# Patient Record
Sex: MALE | Race: WHITE | NOT HISPANIC OR LATINO | Employment: FULL TIME | URBAN - METROPOLITAN AREA
[De-identification: names, ages, dates, MRNs, and addresses within clinical notes are randomized per-mention and may not be internally consistent; named-entity substitution may affect disease eponyms.]

---

## 2017-10-06 ENCOUNTER — APPOINTMENT (OUTPATIENT)
Dept: OTHER | Facility: HOSPITAL | Age: 57
End: 2017-10-06

## 2017-11-01 ENCOUNTER — OFFICE VISIT (OUTPATIENT)
Dept: ONCOLOGY | Facility: CLINIC | Age: 57
End: 2017-11-01

## 2017-11-01 ENCOUNTER — LAB (OUTPATIENT)
Dept: OTHER | Facility: HOSPITAL | Age: 57
End: 2017-11-01

## 2017-11-01 VITALS
HEART RATE: 68 BPM | TEMPERATURE: 98.5 F | HEIGHT: 74 IN | RESPIRATION RATE: 16 BRPM | WEIGHT: 210 LBS | SYSTOLIC BLOOD PRESSURE: 118 MMHG | BODY MASS INDEX: 26.95 KG/M2 | DIASTOLIC BLOOD PRESSURE: 75 MMHG | OXYGEN SATURATION: 97 %

## 2017-11-01 DIAGNOSIS — C95.10 CHRONIC LEUKEMIA OF UNSPECIFIED CELL TYPE NOT HAVING ACHIEVED REMISSION (HCC): Primary | ICD-10-CM

## 2017-11-01 LAB
BASOPHILS # BLD AUTO: 0.05 10*3/MM3 (ref 0–0.2)
BASOPHILS NFR BLD AUTO: 0.7 % (ref 0–1.5)
DEPRECATED RDW RBC AUTO: 38 FL (ref 37–54)
EOSINOPHIL # BLD AUTO: 0.1 10*3/MM3 (ref 0–0.7)
EOSINOPHIL NFR BLD AUTO: 1.4 % (ref 0.3–6.2)
ERYTHROCYTE [DISTWIDTH] IN BLOOD BY AUTOMATED COUNT: 12.1 % (ref 11.5–14.5)
HCT VFR BLD AUTO: 39.7 % (ref 40.4–52.2)
HGB BLD-MCNC: 13.7 G/DL (ref 13.7–17.6)
IMM GRANULOCYTES # BLD: 0.04 10*3/MM3 (ref 0–0.03)
IMM GRANULOCYTES NFR BLD: 0.5 % (ref 0–0.5)
LYMPHOCYTES # BLD AUTO: 2.28 10*3/MM3 (ref 0.9–4.8)
LYMPHOCYTES NFR BLD AUTO: 31.3 % (ref 19.6–45.3)
MCH RBC QN AUTO: 29.5 PG (ref 27–32.7)
MCHC RBC AUTO-ENTMCNC: 34.5 G/DL (ref 32.6–36.4)
MCV RBC AUTO: 85.4 FL (ref 79.8–96.2)
MONOCYTES # BLD AUTO: 0.6 10*3/MM3 (ref 0.2–1.2)
MONOCYTES NFR BLD AUTO: 8.2 % (ref 5–12)
NEUTROPHILS # BLD AUTO: 4.21 10*3/MM3 (ref 1.9–8.1)
NEUTROPHILS NFR BLD AUTO: 57.9 % (ref 42.7–76)
NRBC BLD MANUAL-RTO: 0 /100 WBC (ref 0–0)
PLATELET # BLD AUTO: 255 10*3/MM3 (ref 140–500)
PMV BLD AUTO: 10.5 FL (ref 6–12)
RBC # BLD AUTO: 4.65 10*6/MM3 (ref 4.6–6)
WBC NRBC COR # BLD: 7.28 10*3/MM3 (ref 4.5–10.7)

## 2017-11-01 PROCEDURE — 36415 COLL VENOUS BLD VENIPUNCTURE: CPT

## 2017-11-01 PROCEDURE — 85025 COMPLETE CBC W/AUTO DIFF WBC: CPT | Performed by: INTERNAL MEDICINE

## 2017-11-01 PROCEDURE — 99213 OFFICE O/P EST LOW 20 MIN: CPT | Performed by: INTERNAL MEDICINE

## 2017-11-01 RX ORDER — TIZANIDINE 2 MG/1
TABLET ORAL
COMMUNITY
Start: 2017-07-31 | End: 2020-10-30

## 2017-11-01 RX ORDER — METHYLPREDNISOLONE 4 MG/1
TABLET ORAL
COMMUNITY
Start: 2017-07-31 | End: 2017-11-01

## 2017-11-01 RX ORDER — OMEPRAZOLE 40 MG/1
CAPSULE, DELAYED RELEASE ORAL
COMMUNITY
Start: 2017-09-11

## 2017-11-01 RX ORDER — FLUTICASONE PROPIONATE 50 MCG
SPRAY, SUSPENSION (ML) NASAL
COMMUNITY
Start: 2017-09-11

## 2017-11-01 NOTE — PROGRESS NOTES
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        HISTORY OF PRESENT ILLNESS: The patient is a 57-year-old  male presenting today for annual followup. He reports a lot stress in life, undergoing devoice process because his wife is alcoholic.  Otherwise he reports excellent performance ECOG 0. No weight loss, no fever, sweating. He denies easy infection, no fever, sweating or chills.        Laboratory study today reports a normal total WBC at 7280, including lymphocytes at 2280, and improved neutrophils 4210, and monocytes 600.  Hemoglobin 13.7 and platelets at 255,000.        PAST MEDICAL HISTORY:    1. Two strep throat infections in 2009.    2. Gastroesophageal reflux.    3. Seasonal allergies.        PAST SURGICAL HISTORY:    1. Multiple lipoma resection from his back.    2. Right ankle Achilles tendon repair.        HEMATOLOGIC/ONCOLOGIC HISTORY: History from previous dates can be found in a separate document.        The patient was re-evaluated on 2015. The patient reports no symptoms. Physical examination was unremarkable. Laboratory study reporting normal total WBC at 7050, including lymphocytes at 3270, neutrophils 2920, and monocytes 700. Hemoglobin was normal at 14.5 and platelets at 247,000.        MEDICATIONS: The current medication list was reviewed with the patient and updates in the EMR this date per the medical assistant. Medication dosages and frequencies were confirmed to be accurate        ALLERGIES: No known drug allergies.        SOCIAL HISTORY: . 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 Previously worked as . The patient has no history of tobacco smoking or alcohol use. No illegal drug use, no risk for HIV.        FAMILY HISTORY: Father  of heart attack at age 67. Patient's mother is in good health at age 75 in . A brother at age 52 with unknown health  "condition. His two sisters are both in good health in their 40' s. The patient has 3 teenage children all in good health condition . The patient's maternal grandmother had hypertension, and colon cancer. Maternal grandfather had bone cancer, no details. Paternal grandfather had prostate cancer.        REVIEW OF SYSTEMS:    PAIN: See VITAL SIGNS below.    GENE RAL: No change in appetite or weight; no fevers, chills, sweats.     SKIN: No rashes or nonhealing lesions.     HEME/LYMPH: No anemia, easy bruising, bleeding or swollen nodes.     EYES: No vision changes or diplopia.     ENT: No tinnitus, hearing loss, gum bleeding, epistaxis, hoarseness or dysphagia.     RESPIRATORY: No cough, shortness of breath, hemoptysis or wheezing.     CVS: No chest pain, palpitations, orthopnea, dyspnea on exertion or PND.    GI: Chronic indigestion.    : No lower tract obstructive symptoms, dysuria or hematuria.    MUSCULOSKELETAL: No bone pain or joint stiffness.    NEUROLOGICAL: No dizziness, global weakness, loss of consciousness or seizures.    PSYCHIATRIC: No increased nervousness, mood changes or depression.          VITAL SIGNS:    Vitals:    17 1428   BP: 118/75   Pulse: 68   Resp: 16   Temp: 98.5 °F (36.9 °C)   TempSrc: Oral   SpO2: 97%   Weight: 210 lb (95.3 kg)   Height: 74.02\" (188 cm)  Comment: NEW HT   PainSc: 0-No pain   ECO          PHYSICAL EXAMINATION:    GENERAL: Well-developed and well-nourished, healthy looking male.     SKIN: Warm and dry without rashes, purpura or petechiae.     HEAD: Normocephalic.    EYES: Conjunctivae normal.     EARS: Hearing intact.     NOSE: No nasal discharge.     MOUTH: Lips normal.     THROAT: Oropharynx without lesions or exudates.    NECK: Supple with good range of motion; no thyromegaly or masses.     LYMPHATICS: No cervical, supraclavicular adenopathy.     CHEST: Lungs clear to auscultation.     CARDIAC: Regular rate and rhythm without murmurs, rubs or gallops. "     ABDOMEN: Soft and nontender with no organomegaly or masses.     EXTREMITIES: No clubbing, cyanosis or edema.  Left hand small finger has deformity due to injury.    NEURO: No focal deficits.        LABORATORY DATA:    Lab Results   Component Value Date    WBC 7.28 11/01/2017    HGB 13.7 11/01/2017    HCT 39.7 (L) 11/01/2017    MCV 85.4 11/01/2017     11/01/2017     Lab Results   Component Value Date    NEUTROABS 4.21 11/01/2017           ASSESSMENT/PLAN: The patient is a 57-year-old  male who comes in for annual follow up evaluation.  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 The patient has no specific complaints.  Physical examination is benign. Laboratory study showed Improved WBC/ANC and platelet.  No evidence of disease progress.  Continue to observe.         We will bring him back in 12 months for annual followup and repeat CBC check at that time.              DINESH ISRAEL M.D., Ph.D.    11/01/2017      cc:  MD KINDRA LARKIN M.D. - FAX: [phone] (patient's brother)

## 2018-06-20 VITALS
SYSTOLIC BLOOD PRESSURE: 135 MMHG | SYSTOLIC BLOOD PRESSURE: 106 MMHG | OXYGEN SATURATION: 98 % | SYSTOLIC BLOOD PRESSURE: 112 MMHG | HEART RATE: 59 BPM | TEMPERATURE: 96.7 F | SYSTOLIC BLOOD PRESSURE: 147 MMHG | HEIGHT: 74 IN | SYSTOLIC BLOOD PRESSURE: 111 MMHG | DIASTOLIC BLOOD PRESSURE: 72 MMHG | HEART RATE: 49 BPM | RESPIRATION RATE: 30 BRPM | HEART RATE: 55 BPM | DIASTOLIC BLOOD PRESSURE: 66 MMHG | SYSTOLIC BLOOD PRESSURE: 121 MMHG | SYSTOLIC BLOOD PRESSURE: 101 MMHG | DIASTOLIC BLOOD PRESSURE: 57 MMHG | WEIGHT: 220 LBS | DIASTOLIC BLOOD PRESSURE: 75 MMHG | RESPIRATION RATE: 15 BRPM | SYSTOLIC BLOOD PRESSURE: 105 MMHG | DIASTOLIC BLOOD PRESSURE: 88 MMHG | HEART RATE: 61 BPM | RESPIRATION RATE: 18 BRPM | HEART RATE: 52 BPM | SYSTOLIC BLOOD PRESSURE: 119 MMHG | OXYGEN SATURATION: 95 % | SYSTOLIC BLOOD PRESSURE: 107 MMHG | OXYGEN SATURATION: 94 % | TEMPERATURE: 97.1 F | OXYGEN SATURATION: 99 % | RESPIRATION RATE: 23 BRPM | DIASTOLIC BLOOD PRESSURE: 64 MMHG | DIASTOLIC BLOOD PRESSURE: 74 MMHG | HEART RATE: 54 BPM | DIASTOLIC BLOOD PRESSURE: 83 MMHG | RESPIRATION RATE: 14 BRPM | DIASTOLIC BLOOD PRESSURE: 69 MMHG | HEART RATE: 50 BPM | DIASTOLIC BLOOD PRESSURE: 71 MMHG | OXYGEN SATURATION: 96 % | RESPIRATION RATE: 16 BRPM | HEART RATE: 48 BPM

## 2018-06-21 ENCOUNTER — OFFICE (AMBULATORY)
Dept: URBAN - METROPOLITAN AREA PATHOLOGY 4 | Facility: PATHOLOGY | Age: 58
End: 2018-06-21

## 2018-06-21 ENCOUNTER — AMBULATORY SURGICAL CENTER (AMBULATORY)
Dept: URBAN - METROPOLITAN AREA SURGERY 17 | Facility: SURGERY | Age: 58
End: 2018-06-21

## 2018-06-21 DIAGNOSIS — K63.5 POLYP OF COLON: ICD-10-CM

## 2018-06-21 DIAGNOSIS — Z86.010 PERSONAL HISTORY OF COLONIC POLYPS: ICD-10-CM

## 2018-06-21 DIAGNOSIS — K64.8 OTHER HEMORRHOIDS: ICD-10-CM

## 2018-06-21 DIAGNOSIS — K57.30 DIVERTICULOSIS OF LARGE INTESTINE WITHOUT PERFORATION OR ABS: ICD-10-CM

## 2018-06-21 LAB
GI HISTOLOGY: A. UNSPECIFIED: (no result)
GI HISTOLOGY: PDF REPORT: (no result)

## 2018-06-21 PROCEDURE — 88305 TISSUE EXAM BY PATHOLOGIST: CPT | Performed by: INTERNAL MEDICINE

## 2018-06-21 PROCEDURE — 45385 COLONOSCOPY W/LESION REMOVAL: CPT | Mod: 33 | Performed by: INTERNAL MEDICINE

## 2018-06-21 RX ADMIN — PROPOFOL 50 MG: 10 INJECTION, EMULSION INTRAVENOUS at 10:27

## 2018-06-21 RX ADMIN — PROPOFOL 100 MG: 10 INJECTION, EMULSION INTRAVENOUS at 10:25

## 2018-06-21 RX ADMIN — LIDOCAINE HYDROCHLORIDE 25 MG: 10 INJECTION, SOLUTION EPIDURAL; INFILTRATION; INTRACAUDAL; PERINEURAL at 10:25

## 2018-06-21 NOTE — SERVICEHPINOTES
56 yo WM with a hx. of polyps. No GI complaints. His mom had polyps but at what age is unclear. His grandmother on his mothers side had colon cancer.

## 2019-05-02 ENCOUNTER — LAB (OUTPATIENT)
Dept: OTHER | Facility: HOSPITAL | Age: 59
End: 2019-05-02

## 2019-05-02 ENCOUNTER — OFFICE VISIT (OUTPATIENT)
Dept: ONCOLOGY | Facility: CLINIC | Age: 59
End: 2019-05-02

## 2019-05-02 VITALS
OXYGEN SATURATION: 97 % | DIASTOLIC BLOOD PRESSURE: 83 MMHG | RESPIRATION RATE: 16 BRPM | BODY MASS INDEX: 27.22 KG/M2 | TEMPERATURE: 98 F | WEIGHT: 212.1 LBS | SYSTOLIC BLOOD PRESSURE: 130 MMHG | HEART RATE: 48 BPM | HEIGHT: 74 IN

## 2019-05-02 DIAGNOSIS — C95.10 CHRONIC LEUKEMIA OF UNSPECIFIED CELL TYPE NOT HAVING ACHIEVED REMISSION (HCC): Primary | ICD-10-CM

## 2019-05-02 LAB
BASOPHILS # BLD AUTO: 0.05 10*3/MM3 (ref 0–0.2)
BASOPHILS NFR BLD AUTO: 0.7 % (ref 0–1.5)
DEPRECATED RDW RBC AUTO: 38.5 FL (ref 37–54)
EOSINOPHIL # BLD AUTO: 0.16 10*3/MM3 (ref 0–0.4)
EOSINOPHIL NFR BLD AUTO: 2.3 % (ref 0.3–6.2)
ERYTHROCYTE [DISTWIDTH] IN BLOOD BY AUTOMATED COUNT: 11.9 % (ref 12.3–15.4)
HCT VFR BLD AUTO: 48.9 % (ref 37.5–51)
HGB BLD-MCNC: 16.7 G/DL (ref 13–17.7)
IMM GRANULOCYTES # BLD AUTO: 0.02 10*3/MM3 (ref 0–0.05)
IMM GRANULOCYTES NFR BLD AUTO: 0.3 % (ref 0–0.5)
LYMPHOCYTES # BLD AUTO: 2.18 10*3/MM3 (ref 0.7–3.1)
LYMPHOCYTES NFR BLD AUTO: 31.1 % (ref 19.6–45.3)
MCH RBC QN AUTO: 29.8 PG (ref 26.6–33)
MCHC RBC AUTO-ENTMCNC: 34.2 G/DL (ref 31.5–35.7)
MCV RBC AUTO: 87.2 FL (ref 79–97)
MONOCYTES # BLD AUTO: 0.58 10*3/MM3 (ref 0.1–0.9)
MONOCYTES NFR BLD AUTO: 8.3 % (ref 5–12)
NEUTROPHILS # BLD AUTO: 4.02 10*3/MM3 (ref 1.7–7)
NEUTROPHILS NFR BLD AUTO: 57.3 % (ref 42.7–76)
NRBC BLD AUTO-RTO: 0 /100 WBC (ref 0–0.2)
PLATELET # BLD AUTO: 205 10*3/MM3 (ref 140–450)
PMV BLD AUTO: 10.8 FL (ref 6–12)
RBC # BLD AUTO: 5.61 10*6/MM3 (ref 4.14–5.8)
WBC NRBC COR # BLD: 7.01 10*3/MM3 (ref 3.4–10.8)

## 2019-05-02 PROCEDURE — 99213 OFFICE O/P EST LOW 20 MIN: CPT | Performed by: INTERNAL MEDICINE

## 2019-05-02 PROCEDURE — 36415 COLL VENOUS BLD VENIPUNCTURE: CPT

## 2019-05-02 PROCEDURE — 85025 COMPLETE CBC W/AUTO DIFF WBC: CPT | Performed by: INTERNAL MEDICINE

## 2019-05-02 NOTE — PROGRESS NOTES
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        HISTORY OF PRESENT ILLNESS: The patient is a 58-year-old  male presenting today for 18-month followup.     Patient reports he is doing well, does not feel sick.  He reports excellent performance ECOG 0. No weight loss, no fever, sweating. He denies easy infection, no fever, sweating or chills.        Laboratory studies today reported normal WBC 7010, including ANC 4020, lymphocytes 2180, monocytes 580, eosinophil 160.  He has normal platelets 205,000 and a normal hemoglobin 16.7 and MCV 87.2.     When he was here last time, laboratory studies on 11/1/2017 reports a normal total WBC at 7280, including lymphocytes at 2280, and improved neutrophils 4210, and monocytes 600.  Hemoglobin 13.7 and platelets at 255,000.        PAST MEDICAL HISTORY:    1. Two strep throat infections in June 2009.    2. Gastroesophageal reflux.    3. Seasonal allergies.        PAST SURGICAL HISTORY:    1. Multiple lipoma resection from his back.    2. Right ankle Achilles tendon repair.        HEMATOLOGIC/ONCOLOGIC HISTORY: History from previous dates can be found in a separate document.        The patient was re-evaluated on 05/08/2015. The patient reports no symptoms. Physical examination was unremarkable. Laboratory study reporting normal total WBC at 7050, including lymphocytes at 3270, neutrophils 2920, and monocytes 700. Hemoglobin was normal at 14.5 and platelets at 247,000.        Laboratory studies on 11/1/2017 reports a normal total WBC at 7280, including lymphocytes at 2280, and improved neutrophils 4210, and monocytes 600.  Hemoglobin 13.7 and platelets at 255,000.      MEDICATIONS: The current medication list was reviewed with the patient and updates in the EMR this date per the medical assistant. Medication dosages and frequencies were confirmed to be accurate        ALLERGIES: No known drug allergies.        SOCIAL  "HISTORY: . 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 Previously worked as . The patient has no history of tobacco smoking or alcohol use. No illegal drug use, no risk for HIV.        FAMILY HISTORY: Father  of heart attack at age 67. Patient's mother is in good health at age 75 in . A brother at age 52 with unknown health condition. His two sisters are both in good health in their 40' s. The patient has 3 teenage children all in good health condition . The patient's maternal grandmother had hypertension, and colon cancer. Maternal grandfather had bone cancer, no details. Paternal grandfather had prostate cancer.        REVIEW OF SYSTEMS:     PAIN: See VITAL SIGNS below.    GENERAL: Denies change in appetite or weight; no fevers, chills, sweats.     SKIN: No rashes or nonhealing lesions.     HEME/LYMPH: No easy bruising, bleeding or swollen nodes.     EYES: No vision changes or diplopia.     ENT: No tinnitus, hearing loss, gum bleeding, epistaxis, hoarseness or dysphagia.     RESPIRATORY: No cough, shortness of breath, hemoptysis or wheezing.     CVS: No chest pain, palpitations, orthopnea, dyspnea on exertion.    GI: Chronic indigestion.  Denies nausea vomiting melena hematochezia.  : No lower tract obstructive symptoms, dysuria or hematuria.    MUSCULOSKELETAL: No bone pain or joint stiffness.    NEUROLOGICAL: No dizziness, global weakness, loss of consciousness or seizures.    PSYCHIATRIC: No increased nervousness, mood changes or depression.          VITAL SIGNS:    Vitals:    19 1103   BP: 130/83   Pulse: (!) 48   Resp: 16   Temp: 98 °F (36.7 °C)   TempSrc: Oral   SpO2: 97%   Weight: 96.2 kg (212 lb 1.6 oz)   Height: 188 cm (74.02\")  Comment: new yearly height   PainSc: 0-No pain   ECO          PHYSICAL EXAMINATION:    GENERAL:  Well-developed, well-nourished male in no acute distress.   SKIN:  Warm, dry without rashes, purpura or " petechiae.  EYES:  Pupils equal, round.  EOMs intact.  Conjunctivae normal.  EARS:  Hearing intact.  NECK:  Supple with good range of motion; no thyromegaly or masses.  CHEST:  Lungs clear to auscultation. Good airflow.  Normal respiratory effort.  CARDIAC:  Regular rate and rhythm without murmurs, rubs or gallops. Normal S1,S2.  ABDOMEN:  Soft, nontender with no hepatosplenomegaly or masses.  Bowel sounds normal.  EXTREMITIES:  No clubbing, cyanosis or edema.  NEUROLOGICAL:  Cranial Nerves II-XII grossly intact.  No focal neurological deficits.  PSYCHIATRIC:  Normal affect and mood.         LABORATORY DATA:    Lab Results   Component Value Date    WBC 7.01 05/02/2019    HGB 16.7 05/02/2019    HCT 48.9 05/02/2019    MCV 87.2 05/02/2019     05/02/2019     Lab Results   Component Value Date    NEUTROABS 4.02 05/02/2019   Lymphocytes 2180 monocytes 580 5/2/2019.        ASSESSMENT/PLAN:     The patient is a 58-year-old  male who comes in for 18-month follow up evaluation.  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      The patient has no specific complaints, he denies recurrent bacterial infection..  Physical examination is benign, original lymphadenopathy, bruising.     Laboratory study showed Improved normal CBC including neutrophils lymphocytes, hemoglobin and platelet.  No evidence of disease progress.  Continue to observe.         We will bring him back in 12 months for followup and repeat CBC at that time.              DINESH ISRAEL M.D., Ph.D.    5/2/2019.      cc:  MD KINDRA LARKIN M.D. - FAX: [phone] (patient's brother)

## 2020-06-22 ENCOUNTER — LAB (OUTPATIENT)
Dept: LAB | Facility: HOSPITAL | Age: 60
End: 2020-06-22

## 2020-06-22 ENCOUNTER — OFFICE VISIT (OUTPATIENT)
Dept: ONCOLOGY | Facility: CLINIC | Age: 60
End: 2020-06-22

## 2020-06-22 VITALS
TEMPERATURE: 97.8 F | HEIGHT: 74 IN | OXYGEN SATURATION: 96 % | WEIGHT: 216.3 LBS | SYSTOLIC BLOOD PRESSURE: 142 MMHG | DIASTOLIC BLOOD PRESSURE: 90 MMHG | HEART RATE: 63 BPM | RESPIRATION RATE: 16 BRPM | BODY MASS INDEX: 27.76 KG/M2

## 2020-06-22 DIAGNOSIS — C95.10 CHRONIC LEUKEMIA OF UNSPECIFIED CELL TYPE NOT HAVING ACHIEVED REMISSION (HCC): Primary | ICD-10-CM

## 2020-06-22 LAB
BASOPHILS # BLD AUTO: 0.05 10*3/MM3 (ref 0–0.2)
BASOPHILS NFR BLD AUTO: 0.7 % (ref 0–1.5)
DEPRECATED RDW RBC AUTO: 39 FL (ref 37–54)
EOSINOPHIL # BLD AUTO: 0.2 10*3/MM3 (ref 0–0.4)
EOSINOPHIL NFR BLD AUTO: 2.9 % (ref 0.3–6.2)
ERYTHROCYTE [DISTWIDTH] IN BLOOD BY AUTOMATED COUNT: 12.1 % (ref 12.3–15.4)
HCT VFR BLD AUTO: 44.6 % (ref 37.5–51)
HGB BLD-MCNC: 15 G/DL (ref 13–17.7)
IMM GRANULOCYTES # BLD AUTO: 0.03 10*3/MM3 (ref 0–0.05)
IMM GRANULOCYTES NFR BLD AUTO: 0.4 % (ref 0–0.5)
LYMPHOCYTES # BLD AUTO: 2.19 10*3/MM3 (ref 0.7–3.1)
LYMPHOCYTES NFR BLD AUTO: 31.6 % (ref 19.6–45.3)
MCH RBC QN AUTO: 30 PG (ref 26.6–33)
MCHC RBC AUTO-ENTMCNC: 33.6 G/DL (ref 31.5–35.7)
MCV RBC AUTO: 89.2 FL (ref 79–97)
MONOCYTES # BLD AUTO: 0.61 10*3/MM3 (ref 0.1–0.9)
MONOCYTES NFR BLD AUTO: 8.8 % (ref 5–12)
NEUTROPHILS # BLD AUTO: 3.84 10*3/MM3 (ref 1.7–7)
NEUTROPHILS NFR BLD AUTO: 55.6 % (ref 42.7–76)
NRBC BLD AUTO-RTO: 0 /100 WBC (ref 0–0.2)
PLATELET # BLD AUTO: 267 10*3/MM3 (ref 140–450)
PMV BLD AUTO: 10.5 FL (ref 6–12)
RBC # BLD AUTO: 5 10*6/MM3 (ref 4.14–5.8)
WBC NRBC COR # BLD: 6.92 10*3/MM3 (ref 3.4–10.8)

## 2020-06-22 PROCEDURE — 85025 COMPLETE CBC W/AUTO DIFF WBC: CPT

## 2020-06-22 PROCEDURE — 36415 COLL VENOUS BLD VENIPUNCTURE: CPT

## 2020-06-22 PROCEDURE — 99213 OFFICE O/P EST LOW 20 MIN: CPT | Performed by: INTERNAL MEDICINE

## 2020-06-22 RX ORDER — CETIRIZINE HYDROCHLORIDE 10 MG/1
10 TABLET ORAL DAILY
COMMUNITY

## 2020-06-22 NOTE — PROGRESS NOTES
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        HISTORY OF PRESENT ILLNESS: The patient is a 59 y.o.  male presenting today for 12-month follow-up with laboratory review.    Patient reports he is doing very well, does not feel sick.  He reports excellent performance ECOG 0. No weight loss, no fever, sweating. He denies easy infection, no fever, sweating or chills.     Patient reports no new health condition.      Laboratory studies today, 6/22/2020, show completely normal CBC including WBC 6920, neutrophils 3840, lymphocytes 2190 and monocytes 610.  He has normal hemoglobin 15.0, and platelets 267,000.        PAST MEDICAL HISTORY:    1. Two strep throat infections in June 2009.    2. Gastroesophageal reflux.    3. Seasonal allergies.        PAST SURGICAL HISTORY:    1. Multiple lipoma resection from his back.    2. Right ankle Achilles tendon repair.        HEMATOLOGIC/ONCOLOGIC HISTORY: History from previous dates can be found in a separate document.        The patient was re-evaluated on 05/08/2015. The patient reports no symptoms. Physical examination was unremarkable. Laboratory study reporting normal total WBC at 7050, including lymphocytes at 3270, neutrophils 2920, and monocytes 700. Hemoglobin was normal at 14.5 and platelets at 247,000.        Laboratory studies on 11/1/2017 reports a normal total WBC at 7280, including lymphocytes at 2280, and improved neutrophils 4210, and monocytes 600.  Hemoglobin 13.7 and platelets at 255,000.      Laboratory studies on 5/2/2019 reported normal WBC 7010, including ANC 4020, lymphocytes 2180, monocytes 580, eosinophil 160.  He has normal platelets 205,000 and a normal hemoglobin 16.7 and MCV 87.2.     MEDICATIONS: The current medication list was reviewed with the patient and updates in the EMR this date per the medical assistant. Medication dosages and frequencies were confirmed to be accurate         ALLERGIES: No known drug allergies.        SOCIAL HISTORY: . 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 Previously worked as . The patient has no history of tobacco smoking or alcohol use. No illegal drug use, no risk for HIV.        FAMILY HISTORY: Father  of heart attack at age 67. Patient's mother is in good health at age 75 in . A brother at age 52 with unknown health condition. His two sisters are both in good health in their 40' s. The patient has 3 teenage children all in good health condition . The patient's maternal grandmother had hypertension, and colon cancer. Maternal grandfather had bone cancer, no details. Paternal grandfather had prostate cancer.        REVIEW OF SYSTEMS:   Review of Systems   Constitutional: Negative for chills, diaphoresis, fever and unexpected weight change.   HENT: Negative for hearing loss, nosebleeds, sinus pain and sore throat.    Eyes: Negative for pain and visual disturbance.   Respiratory: Negative for cough and shortness of breath.    Cardiovascular: Negative for chest pain and palpitations.   Gastrointestinal: Negative for abdominal pain, diarrhea, nausea and vomiting.        Chronic indigestion   Endocrine: Negative for cold intolerance, heat intolerance and polydipsia.   Genitourinary: Negative for difficulty urinating, dysuria and hematuria.   Musculoskeletal: Negative for arthralgias, joint swelling and myalgias.   Skin: Negative for rash and wound.   Allergic/Immunologic: Negative for environmental allergies.   Neurological: Negative for dizziness, syncope and numbness.   Hematological: Negative for adenopathy. Does not bruise/bleed easily.   Psychiatric/Behavioral: Negative for agitation, dysphoric mood and sleep disturbance. The patient is not nervous/anxious.             VITAL SIGNS:    Vitals:    20 0945   BP: 142/90   Pulse: 63   Resp: 16   Temp: 97.8 °F (36.6 °C)   SpO2: 96%   Weight:  "98.1 kg (216 lb 4.8 oz)   Height: 188 cm (74.02\")  Comment: new ht.   PainSc: 0-No pain   ECO          PHYSICAL EXAMINATION:    GENERAL:  Well-developed, well-nourished  male in no acute distress.   SKIN:  Warm, dry without rashes, purpura or petechiae.  EYES:  Pupils equal, round.  EOMs intact.  Conjunctivae normal.  EARS:  Hearing intact.  MOUTH: Patient wears mask due to the pandemic coronavirus infection.   NECK:  Supple with good range of motion; no thyromegaly or masses.  CHEST: Normal respiratory effort.  Lungs clear to auscultation. Good airflow.    CARDIAC:  Regular rate and rhythm without murmurs. Normal S1,S2.  ABDOMEN:  Soft, nontender with no hepatosplenomegaly or masses.  Bowel sounds normal.  EXTREMITIES:  No clubbing, cyanosis or edema.  NEUROLOGICAL:  Cranial Nerves II-XII grossly intact.  No focal neurological deficits.  PSYCHIATRIC:  Normal affect and mood.       LABORATORY DATA:    Lab Results   Component Value Date    WBC 6.92 2020    HGB 15.0 2020    HCT 44.6 2020    MCV 89.2 2020     2020     Lab Results   Component Value Date    NEUTROABS 3.84 2020   Lymphocytes 2190 monocytes 610 2020.        ASSESSMENT:   1.  The patient is a 59 y.o.  male who comes in for 12-month follow up evaluation.  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    · The patient has no specific complaints, he denies recurrent bacterial infection.  Physical examination is benign, no lymphadenopathy..  · Laboratory study showed completely normal CBC including neutrophils, lymphocytes, hemoglobin and platelet.  No evidence of disease progress.  Continue to monitor.      PLAN:  1. We will bring him back in 12 months for followup and repeat CBC at that time.      By signing my name here, I Fletcher Irvin, attest that all documentation on 20 at 09:58 has been prepared under the direction and in the presence of Dr." Dinesh Hanley MD.    I reviewed the note scribed by Fletcher Irvin and made appropriate corrections.       DINESH HANLEY M.D., Ph.D.          cc:  MD KINDRA LARKIN M.D. - FAX: [phone] (patient's brother)

## 2020-08-22 ENCOUNTER — APPOINTMENT (OUTPATIENT)
Dept: CARDIOLOGY | Facility: HOSPITAL | Age: 60
End: 2020-08-22

## 2020-08-22 ENCOUNTER — HOSPITAL ENCOUNTER (EMERGENCY)
Facility: HOSPITAL | Age: 60
Discharge: HOME | End: 2020-08-22
Attending: EMERGENCY MEDICINE | Admitting: EMERGENCY MEDICINE

## 2020-08-22 VITALS
RESPIRATION RATE: 14 BRPM | HEART RATE: 57 BPM | OXYGEN SATURATION: 97 % | SYSTOLIC BLOOD PRESSURE: 124 MMHG | WEIGHT: 225 LBS | BODY MASS INDEX: 28.88 KG/M2 | HEIGHT: 74 IN | TEMPERATURE: 97.3 F | DIASTOLIC BLOOD PRESSURE: 85 MMHG

## 2020-08-22 DIAGNOSIS — I82.462 ACUTE DEEP VEIN THROMBOSIS (DVT) OF CALF MUSCLE VEIN OF LEFT LOWER EXTREMITY (HCC): Primary | ICD-10-CM

## 2020-08-22 LAB
ANION GAP SERPL CALCULATED.3IONS-SCNC: 7.2 MMOL/L (ref 5–15)
APTT PPP: 35.3 SECONDS (ref 22.7–35.4)
BASOPHILS # BLD AUTO: 0.06 10*3/MM3 (ref 0–0.2)
BASOPHILS NFR BLD AUTO: 0.7 % (ref 0–1.5)
BH CV LOW VAS LEFT GASTRONEMIUS VESSEL: 1
BH CV LOWER VASCULAR LEFT COMMON FEMORAL AUGMENT: NORMAL
BH CV LOWER VASCULAR LEFT COMMON FEMORAL COMPETENT: NORMAL
BH CV LOWER VASCULAR LEFT COMMON FEMORAL COMPRESS: NORMAL
BH CV LOWER VASCULAR LEFT COMMON FEMORAL PHASIC: NORMAL
BH CV LOWER VASCULAR LEFT COMMON FEMORAL SPONT: NORMAL
BH CV LOWER VASCULAR LEFT DISTAL FEMORAL COMPRESS: NORMAL
BH CV LOWER VASCULAR LEFT GASTRONEMIUS COMPRESS: NORMAL
BH CV LOWER VASCULAR LEFT GASTRONEMIUS THROMBUS: NORMAL
BH CV LOWER VASCULAR LEFT GREATER SAPH AK COMPRESS: NORMAL
BH CV LOWER VASCULAR LEFT GREATER SAPH BK COMPRESS: NORMAL
BH CV LOWER VASCULAR LEFT MID FEMORAL AUGMENT: NORMAL
BH CV LOWER VASCULAR LEFT MID FEMORAL COMPETENT: NORMAL
BH CV LOWER VASCULAR LEFT MID FEMORAL COMPRESS: NORMAL
BH CV LOWER VASCULAR LEFT MID FEMORAL PHASIC: NORMAL
BH CV LOWER VASCULAR LEFT MID FEMORAL SPONT: NORMAL
BH CV LOWER VASCULAR LEFT PERONEAL COMPRESS: NORMAL
BH CV LOWER VASCULAR LEFT POPLITEAL AUGMENT: NORMAL
BH CV LOWER VASCULAR LEFT POPLITEAL COMPETENT: NORMAL
BH CV LOWER VASCULAR LEFT POPLITEAL COMPRESS: NORMAL
BH CV LOWER VASCULAR LEFT POPLITEAL PHASIC: NORMAL
BH CV LOWER VASCULAR LEFT POPLITEAL SPONT: NORMAL
BH CV LOWER VASCULAR LEFT POSTERIOR TIBIAL COMPRESS: NORMAL
BH CV LOWER VASCULAR LEFT PROXIMAL FEMORAL COMPRESS: NORMAL
BH CV LOWER VASCULAR LEFT SAPHENOFEMORAL JUNCTION COMPRESS: NORMAL
BH CV LOWER VASCULAR RIGHT COMMON FEMORAL AUGMENT: NORMAL
BH CV LOWER VASCULAR RIGHT COMMON FEMORAL COMPETENT: NORMAL
BH CV LOWER VASCULAR RIGHT COMMON FEMORAL COMPRESS: NORMAL
BH CV LOWER VASCULAR RIGHT COMMON FEMORAL PHASIC: NORMAL
BH CV LOWER VASCULAR RIGHT COMMON FEMORAL SPONT: NORMAL
BUN SERPL-MCNC: 13 MG/DL (ref 6–20)
BUN/CREAT SERPL: 12.3 (ref 7–25)
CALCIUM SPEC-SCNC: 9.1 MG/DL (ref 8.6–10.5)
CHLORIDE SERPL-SCNC: 105 MMOL/L (ref 98–107)
CO2 SERPL-SCNC: 20.8 MMOL/L (ref 22–29)
CREAT SERPL-MCNC: 1.06 MG/DL (ref 0.76–1.27)
DEPRECATED RDW RBC AUTO: 40.5 FL (ref 37–54)
EOSINOPHIL # BLD AUTO: 0.18 10*3/MM3 (ref 0–0.4)
EOSINOPHIL NFR BLD AUTO: 2.2 % (ref 0.3–6.2)
ERYTHROCYTE [DISTWIDTH] IN BLOOD BY AUTOMATED COUNT: 12.6 % (ref 12.3–15.4)
GFR SERPL CREATININE-BSD FRML MDRD: 72 ML/MIN/1.73
GLUCOSE SERPL-MCNC: 102 MG/DL (ref 65–99)
HCT VFR BLD AUTO: 42.8 % (ref 37.5–51)
HGB BLD-MCNC: 14.4 G/DL (ref 13–17.7)
IMM GRANULOCYTES # BLD AUTO: 0.02 10*3/MM3 (ref 0–0.05)
IMM GRANULOCYTES NFR BLD AUTO: 0.2 % (ref 0–0.5)
INR PPP: 1.01 (ref 0.9–1.1)
LYMPHOCYTES # BLD AUTO: 2.61 10*3/MM3 (ref 0.7–3.1)
LYMPHOCYTES NFR BLD AUTO: 31.4 % (ref 19.6–45.3)
MCH RBC QN AUTO: 29.9 PG (ref 26.6–33)
MCHC RBC AUTO-ENTMCNC: 33.6 G/DL (ref 31.5–35.7)
MCV RBC AUTO: 88.8 FL (ref 79–97)
MONOCYTES # BLD AUTO: 0.9 10*3/MM3 (ref 0.1–0.9)
MONOCYTES NFR BLD AUTO: 10.8 % (ref 5–12)
NEUTROPHILS NFR BLD AUTO: 4.54 10*3/MM3 (ref 1.7–7)
NEUTROPHILS NFR BLD AUTO: 54.7 % (ref 42.7–76)
NRBC BLD AUTO-RTO: 0 /100 WBC (ref 0–0.2)
PLATELET # BLD AUTO: 282 10*3/MM3 (ref 140–450)
PMV BLD AUTO: 10.2 FL (ref 6–12)
POTASSIUM SERPL-SCNC: 3.9 MMOL/L (ref 3.5–5.2)
PROTHROMBIN TIME: 13.2 SECONDS (ref 11.7–14.2)
RBC # BLD AUTO: 4.82 10*6/MM3 (ref 4.14–5.8)
SODIUM SERPL-SCNC: 133 MMOL/L (ref 136–145)
WBC # BLD AUTO: 8.31 10*3/MM3 (ref 3.4–10.8)

## 2020-08-22 PROCEDURE — 36415 COLL VENOUS BLD VENIPUNCTURE: CPT

## 2020-08-22 PROCEDURE — 80048 BASIC METABOLIC PNL TOTAL CA: CPT | Performed by: EMERGENCY MEDICINE

## 2020-08-22 PROCEDURE — 85610 PROTHROMBIN TIME: CPT | Performed by: EMERGENCY MEDICINE

## 2020-08-22 PROCEDURE — 85730 THROMBOPLASTIN TIME PARTIAL: CPT | Performed by: EMERGENCY MEDICINE

## 2020-08-22 PROCEDURE — 99283 EMERGENCY DEPT VISIT LOW MDM: CPT

## 2020-08-22 PROCEDURE — 85025 COMPLETE CBC W/AUTO DIFF WBC: CPT | Performed by: EMERGENCY MEDICINE

## 2020-08-22 PROCEDURE — 93971 EXTREMITY STUDY: CPT

## 2020-08-22 NOTE — ED TRIAGE NOTES
Left calf and behind left knee pain x 1 week.      Patient was placed in face mask during first look triage.  Patient was wearing a face mask throughout encounter.  I wore personal protective equipment throughout the encounter.  Hand hygiene was performed before and after patient encounter.

## 2020-08-22 NOTE — ED PROVIDER NOTES
EMERGENCY DEPARTMENT ENCOUNTER    Room Number:  40/40  Date seen:  8/22/2020  PCP: Roman Alfaro MD  Historian: Patient      HPI:  Chief Complaint: Left leg pain  A complete HPI/ROS/PMH/PSH/SH/FH are unobtainable due to: Nothing  Context: Foreign Olson is a 60 y.o. male who presents to the ED c/o left leg pain.  The pain is localized behind his left knee.  It is been present for 1 week.  He is concerned he may have a DVT.  He has a history of DVT in his right lower extremity about 1 year ago.  He is not currently on a blood thinner.  He recently returned from a trip to [place], drove 10 hours each way.  He denies chest pain or shortness of air.  He has had no fever or chills.  He does have a history of chronic leukemia, not currently on therapy.            PAST MEDICAL HISTORY  Active Ambulatory Problems     Diagnosis Date Noted   • Chronic leukemia of unspecified cell type not having achieved remission (CMS/Formerly [facility]) 10/09/2016     Resolved Ambulatory Problems     Diagnosis Date Noted   • Unspecified disease of white blood cells 10/04/2016     Past Medical History:   Diagnosis Date   • Cancer (CMS/Formerly [facility])    • GERD (gastroesophageal reflux disease)    • H/O seasonal allergies          PAST SURGICAL HISTORY  Past Surgical History:   Procedure Laterality Date   • ACHILLES TENDON REPAIR Right    • LIPOMA RESECTION      Multiple from back         FAMILY HISTORY  Family History   Problem Relation Age of Onset   • Heart attack Father    • Hypertension Maternal Grandmother    • Colon cancer Maternal Grandmother    • Bone cancer Maternal Grandfather    • Prostate cancer Paternal Grandfather          SOCIAL HISTORY  Social History     Socioeconomic History   • Marital status:      Spouse name: Not on file   • Number of children: 3   • Years of education: Not on file   • Highest education level: Not on file   Occupational History     Employer: [employer]   • Occupation: Lawn business     Employer: SELF-EMPLOYED    Tobacco Use   • Smoking status: Never Smoker   • Smokeless tobacco: Never Used   Substance and Sexual Activity   • Alcohol use: No   • Drug use: No   • Sexual activity: Defer         ALLERGIES  Statins        REVIEW OF SYSTEMS  Review of Systems   Review of all 14 systems is negative other than stated in the HPI above.      PHYSICAL EXAM  ED Triage Vitals   Temp Heart Rate Resp BP SpO2   08/22/20 1754 08/22/20 1754 08/22/20 1754 08/22/20 1801 08/22/20 1754   97.3 °F (36.3 °C) 71 16 134/95 100 %      Temp src Heart Rate Source Patient Position BP Location FiO2 (%)   08/22/20 1754 08/22/20 1754 -- -- --   Tympanic Monitor            GENERAL: Awake and alert, no acute distress  HENT: nares patent  EYES: no scleral icterus  CV: regular rhythm, normal rate  RESPIRATORY: normal effort, lungs clear to auscultation bilaterally ABDOMEN: soft, nontender throughout  MUSCULOSKELETAL: no deformity, no significant swelling of bilateral lower extremities.  There is mild left calf tenderness and mild tenderness of the left popliteal fossa.  There is no erythema or warmth of bilateral lower extremities.  NEURO: alert, moves all extremities, follows commands  PSYCH:  calm, cooperative  SKIN: warm, dry, no erythema, no rash    Vital signs and nursing notes reviewed.          LAB RESULTS  Recent Results (from the past 24 hour(s))   Basic Metabolic Panel    Collection Time: 08/22/20  6:06 PM   Result Value Ref Range    Glucose 102 (H) 65 - 99 mg/dL    BUN 13 6 - 20 mg/dL    Creatinine 1.06 0.76 - 1.27 mg/dL    Sodium 133 (L) 136 - 145 mmol/L    Potassium 3.9 3.5 - 5.2 mmol/L    Chloride 105 98 - 107 mmol/L    CO2 20.8 (L) 22.0 - 29.0 mmol/L    Calcium 9.1 8.6 - 10.5 mg/dL    eGFR Non African Amer 72 >60 mL/min/1.73    BUN/Creatinine Ratio 12.3 7.0 - 25.0    Anion Gap 7.2 5.0 - 15.0 mmol/L   CBC Auto Differential    Collection Time: 08/22/20  6:06 PM   Result Value Ref Range    WBC 8.31 3.40 - 10.80 10*3/mm3    RBC 4.82 4.14 - 5.80  10*6/mm3    Hemoglobin 14.4 13.0 - 17.7 g/dL    Hematocrit 42.8 37.5 - 51.0 %    MCV 88.8 79.0 - 97.0 fL    MCH 29.9 26.6 - 33.0 pg    MCHC 33.6 31.5 - 35.7 g/dL    RDW 12.6 12.3 - 15.4 %    RDW-SD 40.5 37.0 - 54.0 fl    MPV 10.2 6.0 - 12.0 fL    Platelets 282 140 - 450 10*3/mm3    Neutrophil % 54.7 42.7 - 76.0 %    Lymphocyte % 31.4 19.6 - 45.3 %    Monocyte % 10.8 5.0 - 12.0 %    Eosinophil % 2.2 0.3 - 6.2 %    Basophil % 0.7 0.0 - 1.5 %    Immature Grans % 0.2 0.0 - 0.5 %    Neutrophils, Absolute 4.54 1.70 - 7.00 10*3/mm3    Lymphocytes, Absolute 2.61 0.70 - 3.10 10*3/mm3    Monocytes, Absolute 0.90 0.10 - 0.90 10*3/mm3    Eosinophils, Absolute 0.18 0.00 - 0.40 10*3/mm3    Basophils, Absolute 0.06 0.00 - 0.20 10*3/mm3    Immature Grans, Absolute 0.02 0.00 - 0.05 10*3/mm3    nRBC 0.0 0.0 - 0.2 /100 WBC   Protime-INR    Collection Time: 08/22/20  6:06 PM   Result Value Ref Range    Protime 13.2 11.7 - 14.2 Seconds    INR 1.01 0.90 - 1.10   aPTT    Collection Time: 08/22/20  6:06 PM   Result Value Ref Range    PTT 35.3 22.7 - 35.4 seconds   Duplex Venous Lower Extremity - Left CAR    Collection Time: 08/22/20  6:30 PM   Result Value Ref Range    Left GastronemiusSoleal Vessel 1     Right Common Femoral Spont Y     Right Common Femoral Phasic Y     Right Common Femoral Augment Y     Right Common Femoral Competent Y     Right Common Femoral Compress C     Left Common Femoral Spont Y     Left Common Femoral Phasic Y     Left Common Femoral Augment Y     Left Common Femoral Competent Y     Left Common Femoral Compress C     Left Saphenofemoral Junction Compress C     Left Proximal Femoral Compress C     Left Mid Femoral Spont Y     Left Mid Femoral Phasic Y     Left Mid Femoral Augment Y     Left Mid Femoral Competent Y     Left Mid Femoral Compress C     Left Distal Femoral Compress C     Left Popliteal Spont Y     Left Popliteal Phasic Y     Left Popliteal Augment Y     Left Popliteal Competent Y     Left Popliteal  Compress C     Left Posterior Tibial Compress C     Left Peroneal Compress C     Left GastronemiusSoleal Compress N     Left GastronemiusSoleal Thrombus A     Left Greater Saph AK Compress C     Left Greater Saph BK Compress C        Ordered the above labs and reviewed the results.                PROCEDURES  Procedures              MEDICATIONS GIVEN IN ER  Medications - No data to display                MEDICAL DECISION MAKING, PROGRESS, and CONSULTS    All labs have been independently reviewed by me.  All radiology studies have been reviewed by me and discussed with radiologist dictating the report.   EKG's independently viewed and interpreted by me.  Discussion below represents my analysis of pertinent findings related to patient's condition, differential diagnosis, treatment plan and final disposition.    ED Course as of Aug 22 2256   Sat Aug 22, 2020   1805 Patient with left pain in the popliteal fossa, history of prior DVT in the contralateral extremity, current leukemia, and recent long car ride.  I have ordered duplex ultrasound left lower extremity to evaluate for possible DVT.  Labs were obtained to assess CBC and renal function as patient may require anticoagulation.    [JR]   1834 Vascular lab reports that patient is positive for left calf vein DVT.    [JR]   1916 Patient updated on laboratory and ultrasound findings including findings of left lower extremity DVT.  He was provided with a Xarelto starter pack..  He was advised to follow-up with his hematologist or his primary care physician for further anticoagulation medicine.  I also explained bleeding precautions while taking anticoagulants.  I explained return precautions, specifically regarding the development of chest pain or shortness of air.    [JR]   1917 WBC: 8.31 [JR]   1917 Hemoglobin: 14.4 [JR]   1917 Protime: 13.2 [JR]   1917 INR: 1.01 [JR]   1917 PTT: 35.3 [JR]   1917 Creatinine: 1.06 [JR]   1917 Sodium(!): 133 [JR]   1917 CO2(!): 20.8 [JR]       ED Course User Index  [JR] Zaire Welch MD              I wore a surgical mask, gloves, and eye protection during this patient encounter.  Patient also wearing a surgical mask.  Hand hygeine performed before and after seeing the patient.    DIAGNOSIS  Final diagnoses:   Acute deep vein thrombosis (DVT) of calf muscle vein of left lower extremity (CMS/HCC)         DISPOSITION  DISCHARGE    Patient discharged in stable condition.    Reviewed implications of results, diagnosis, meds, responsibility to follow up, warning signs and symptoms of possible worsening, potential complications and reasons to return to ER.    Patient/Family voiced understanding of above instructions.    Discussed plan for discharge, as there is no emergent indication for admission. Patient referred to primary care provider for BP management due to today's BP. Pt/family is agreeable and understands need for follow up and repeat testing.  Pt is aware that discharge does not mean that nothing is wrong but it indicates no emergency is present that requires admission and they must continue care with follow-up as given below or physician of their choice.     FOLLOW-UP  Roman Alfaro MD  [address] [place] [address]  [phone]    Schedule an appointment as soon as possible for a visit            Medication List      New Prescriptions    Rivaroxaban tablet therapy pack starter pack  Commonly known as:  Xarelto Starter Pack  Take one 15 mg tablet twice daily with food for 21 days.  Followed by one   20 mg tablet by mouth once daily with food. Take as directed                      Latest Documented Vital Signs:  As of 22:56  BP- 124/85 HR- 57 Temp- 97.3 °F (36.3 °C) (Tympanic) O2 sat- 97%        --    Please note that portions of this were completed with a voice recognition program.          Zaire Welch MD  08/22/20 7738

## 2020-08-22 NOTE — ED NOTES
Pt to ED ambulatory with steady gait with c/o pain to L calf. Recently drove to [place] and [place]. Pt reports + hx of lymphoma and DVT to R thigh. Denies pain to L thigh. Cough, CP, SOA. Not on blood thinners.     Patient was placed in face mask in first look. Patient was wearing facemask when I entered the room and throughout our encounter. I wore full protective equipment throughout this patient encounter including a face mask, eye shield, and gloves. Hand hygiene was performed before donning protective equipment and after removal when leaving the room.       Radha Mcrae, RN  08/22/20 8905

## 2020-10-30 ENCOUNTER — LAB (OUTPATIENT)
Dept: LAB | Facility: HOSPITAL | Age: 60
End: 2020-10-30

## 2020-10-30 ENCOUNTER — OFFICE VISIT (OUTPATIENT)
Dept: ONCOLOGY | Facility: CLINIC | Age: 60
End: 2020-10-30

## 2020-10-30 VITALS
BODY MASS INDEX: 27.44 KG/M2 | SYSTOLIC BLOOD PRESSURE: 125 MMHG | RESPIRATION RATE: 16 BRPM | HEIGHT: 74 IN | TEMPERATURE: 97.8 F | OXYGEN SATURATION: 98 % | HEART RATE: 67 BPM | WEIGHT: 213.8 LBS | DIASTOLIC BLOOD PRESSURE: 90 MMHG

## 2020-10-30 DIAGNOSIS — I82.462 ACUTE DEEP VEIN THROMBOSIS (DVT) OF CALF MUSCLE VEIN OF LEFT LOWER EXTREMITY (HCC): ICD-10-CM

## 2020-10-30 DIAGNOSIS — I82.411 ACUTE DEEP VEIN THROMBOSIS (DVT) OF FEMORAL VEIN OF RIGHT LOWER EXTREMITY (HCC): ICD-10-CM

## 2020-10-30 DIAGNOSIS — C95.10 CHRONIC LEUKEMIA OF UNSPECIFIED CELL TYPE NOT HAVING ACHIEVED REMISSION (HCC): Primary | ICD-10-CM

## 2020-10-30 DIAGNOSIS — I82.411 ACUTE DEEP VEIN THROMBOSIS (DVT) OF FEMORAL VEIN OF RIGHT LOWER EXTREMITY (HCC): Primary | ICD-10-CM

## 2020-10-30 DIAGNOSIS — C95.10 CHRONIC LEUKEMIA OF UNSPECIFIED CELL TYPE NOT HAVING ACHIEVED REMISSION (HCC): ICD-10-CM

## 2020-10-30 LAB
AT III PPP CHRO-ACNC: 111 % (ref 90–134)
BASOPHILS # BLD AUTO: 0.06 10*3/MM3 (ref 0–0.2)
BASOPHILS NFR BLD AUTO: 0.8 % (ref 0–1.5)
DEPRECATED RDW RBC AUTO: 37.9 FL (ref 37–54)
EOSINOPHIL # BLD AUTO: 0.2 10*3/MM3 (ref 0–0.4)
EOSINOPHIL NFR BLD AUTO: 2.6 % (ref 0.3–6.2)
ERYTHROCYTE [DISTWIDTH] IN BLOOD BY AUTOMATED COUNT: 12 % (ref 12.3–15.4)
HCT VFR BLD AUTO: 44.4 % (ref 37.5–51)
HGB BLD-MCNC: 15.2 G/DL (ref 13–17.7)
IMM GRANULOCYTES # BLD AUTO: 0.02 10*3/MM3 (ref 0–0.05)
IMM GRANULOCYTES NFR BLD AUTO: 0.3 % (ref 0–0.5)
LYMPHOCYTES # BLD AUTO: 2.7 10*3/MM3 (ref 0.7–3.1)
LYMPHOCYTES NFR BLD AUTO: 34.6 % (ref 19.6–45.3)
MCH RBC QN AUTO: 29.5 PG (ref 26.6–33)
MCHC RBC AUTO-ENTMCNC: 34.2 G/DL (ref 31.5–35.7)
MCV RBC AUTO: 86 FL (ref 79–97)
MONOCYTES # BLD AUTO: 0.74 10*3/MM3 (ref 0.1–0.9)
MONOCYTES NFR BLD AUTO: 9.5 % (ref 5–12)
NEUTROPHILS NFR BLD AUTO: 4.09 10*3/MM3 (ref 1.7–7)
NEUTROPHILS NFR BLD AUTO: 52.2 % (ref 42.7–76)
NRBC BLD AUTO-RTO: 0 /100 WBC (ref 0–0.2)
PLATELET # BLD AUTO: 262 10*3/MM3 (ref 140–450)
PMV BLD AUTO: 11.3 FL (ref 6–12)
PROT C ACT/NOR PPP: 182 % (ref 86–163)
PROT S ACT/NOR PPP: >150 % (ref 70–127)
PROT S FREE PPP-ACNC: 101 % (ref 49–138)
RBC # BLD AUTO: 5.16 10*6/MM3 (ref 4.14–5.8)
WBC # BLD AUTO: 7.81 10*3/MM3 (ref 3.4–10.8)

## 2020-10-30 PROCEDURE — 85303 CLOT INHIBIT PROT C ACTIVITY: CPT | Performed by: INTERNAL MEDICINE

## 2020-10-30 PROCEDURE — 85025 COMPLETE CBC W/AUTO DIFF WBC: CPT

## 2020-10-30 PROCEDURE — 85306 CLOT INHIBIT PROT S FREE: CPT | Performed by: INTERNAL MEDICINE

## 2020-10-30 PROCEDURE — 99215 OFFICE O/P EST HI 40 MIN: CPT | Performed by: INTERNAL MEDICINE

## 2020-10-30 PROCEDURE — 85300 ANTITHROMBIN III ACTIVITY: CPT | Performed by: INTERNAL MEDICINE

## 2020-10-30 PROCEDURE — 36415 COLL VENOUS BLD VENIPUNCTURE: CPT

## 2020-10-30 RX ORDER — DIPHENOXYLATE HYDROCHLORIDE AND ATROPINE SULFATE 2.5; .025 MG/1; MG/1
TABLET ORAL DAILY
COMMUNITY
End: 2020-12-18 | Stop reason: SDDI

## 2020-10-30 RX ORDER — POLYETHYLENE GLYCOL 3350 17 G/17G
17 POWDER, FOR SOLUTION ORAL DAILY
Qty: 30 PACKET | Refills: 11 | Status: SHIPPED | OUTPATIENT
Start: 2020-10-30

## 2020-10-30 RX ORDER — ESCITALOPRAM OXALATE 20 MG/1
TABLET ORAL
COMMUNITY
Start: 2020-06-22 | End: 2022-10-07

## 2020-10-30 RX ORDER — AMOXICILLIN 250 MG
2 CAPSULE ORAL 2 TIMES DAILY
Qty: 120 TABLET | Refills: 11 | Status: SHIPPED | OUTPATIENT
Start: 2020-10-30

## 2020-10-30 RX ORDER — EZETIMIBE 10 MG/1
10 TABLET ORAL DAILY
COMMUNITY
Start: 2020-10-02

## 2020-10-30 NOTE — PROGRESS NOTES
REASON FOR FOLLOWUP:   1. 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    2. Recurrent lower extremity DVT.          HISTORY OF PRESENT ILLNESS: The patient is a 60 y.o.  male presenting today for evaluation of recurrent lower extremity DVT.      I saw this patient in June 2020, where he did not tell me he had episodes of DVT a year earlier.  Patient recently called our office, reporting he was diagnosed of leg DVT again in August 2020 and has been taking Xarelto.  He requests to be evaluated for that matter.    Patient reports that more than a year ago, he had right leg DVT after twisting his leg from his job.  He reports anticoagulation probably for 6 months.  However he was not very clear for the, and he was not even clear for the timing of his DVT.  Upon searching medical records, I found that he was diagnosed of acute right leg DVT 2 years ago in October 2018.  He had a venous Doppler study on 10/12/2018 at the [facility], which reported acute, nonocclusive deep vein thrombosis in the common femoral vein, and acute, occlusive superficial vein thrombosis in the great/saphenous vein from the saphenofemoral junction through the mid thigh.  There was also a left lower extremity acute occlusive superficial vein thrombosis in the great saphenous vein from the proximal thigh through the knee.  No evidence of DVT in the left leg.   There was no laboratory work-up for hypercoagulable status at that time.    Patient reports in August 2020, he was driving long distance to [place], sending his child to college.  After he returned to [place], he noticed swelling and tenderness in the left leg.  He had a venous Doppler study 8/22/2020 at the [facility], which reported acute left leg DVT in the gastrocnemius vein.  All other left-sided veins appear normal.  No evidence of DVT in the right common femoral vein.    Patient reports he is Xarelto  anticoagulation.  He is not having bleeding problem.    Patient reports he is otherwise doing very well, does not feel sick.  He reports excellent performance ECOG 0. No weight loss, no fever, sweating. He denies easy infection, no fever, sweating or chills.               PAST MEDICAL HISTORY:    1. Two strep throat infections in June 2009.    2. Gastroesophageal reflux.    3. Seasonal allergies.        PAST SURGICAL HISTORY:    1. Multiple lipoma resection from his back.    2. Right ankle Achilles tendon repair.        HEMATOLOGIC/ONCOLOGIC HISTORY: History from previous dates can be found in a separate document.        The patient was re-evaluated on 05/08/2015. The patient reports no symptoms. Physical examination was unremarkable. Laboratory study reporting normal total WBC at 7050, including lymphocytes at 3270, neutrophils 2920, and monocytes 700. Hemoglobin was normal at 14.5 and platelets at 247,000.        Laboratory studies on 11/1/2017 reports a normal total WBC at 7280, including lymphocytes at 2280, and improved neutrophils 4210, and monocytes 600.  Hemoglobin 13.7 and platelets at 255,000.      Laboratory studies on 5/2/2019 reported normal WBC 7010, including ANC 4020, lymphocytes 2180, monocytes 580, eosinophil 160.  He has normal platelets 205,000 and a normal hemoglobin 16.7 and MCV 87.2.     Laboratory studies 6/22/2020, show completely normal CBC including WBC 6920, neutrophils 3840, lymphocytes 2190 and monocytes 610.  He has normal hemoglobin 15.0, and platelets 267,000.      MEDICATIONS: The current medication list was reviewed with the patient and updates in the EMR this date per the medical assistant. Medication dosages and frequencies were confirmed to be accurate        ALLERGIES: No known drug allergies.        SOCIAL HISTORY: . 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 Previously worked as . The patient has no history of tobacco  "smoking or alcohol use. No illegal drug use, no risk for HIV.        FAMILY HISTORY: Father  of heart attack at age 67. Patient's mother is in good health at age 75 in . A brother at age 52 with unknown health condition. His two sisters are both in good health in their 40' s. The patient has 3 teenage children all in good health condition . The patient's maternal grandmother had hypertension, and colon cancer. Maternal grandfather had bone cancer, no details. Paternal grandfather had prostate cancer.        REVIEW OF SYSTEMS:   Review of Systems   Constitutional: Negative for appetite change, diaphoresis, fatigue, fever and unexpected weight change.   HENT: Negative for hearing loss and nosebleeds.    Eyes: Negative for pain and visual disturbance.   Respiratory: Negative for cough and shortness of breath.    Cardiovascular: Positive for leg swelling (Left lower leg). Negative for chest pain and palpitations.   Gastrointestinal: Negative for abdominal pain, blood in stool, diarrhea and nausea.        Chronic indigestion   Endocrine: Negative for cold intolerance and heat intolerance.   Genitourinary: Negative for dysuria and hematuria.   Musculoskeletal: Negative for arthralgias and joint swelling.   Skin: Negative for color change and pallor.   Allergic/Immunologic: Negative for environmental allergies.   Neurological: Negative for dizziness and numbness.   Hematological: Negative for adenopathy. Does not bruise/bleed easily.   Psychiatric/Behavioral: Negative for agitation and dysphoric mood.            VITAL SIGNS:    Vitals:    10/30/20 0839   BP: 125/90   Pulse: 67   Resp: 16   Temp: 97.8 °F (36.6 °C)   SpO2: 98%   Weight: 97 kg (213 lb 12.8 oz)   Height: 188 cm (74.02\")   PainSc: 0-No pain   ECO          PHYSICAL EXAMINATION:    GENERAL:  Well-developed, well-nourished  male in no acute distress.   SKIN:  Warm, dry without rashes, purpura or petechiae.  EYES:  Pupils equal, round.  " Conjunctivae normal.  EARS:  Hearing intact.  NOSE:  Patient wears mask due to the pandemic coronavirus infection.   MOUTH: Same as above.  THROAT: Same as above.  NECK:  Supple; no thyromegaly or masses.  CHEST: Normal respiratory effort.  Lungs clear to auscultation. Good airflow.    CARDIAC:  Regular rate and rhythm, no murmurs. Normal S1,S2.  ABDOMEN:  Soft, nontender with no hepatosplenomegaly or masses.  Bowel sounds normal.  EXTREMITIES:  No clubbing, cyanosis or edema.  NEUROLOGICAL:  Cranial Nerves II-XII grossly intact.  No focal neurological deficits.  PSYCHIATRIC:  Normal affect and mood.       LABORATORY DATA:    Lab Results   Component Value Date    WBC 7.81 10/30/2020    HGB 15.2 10/30/2020    HCT 44.4 10/30/2020    MCV 86.0 10/30/2020     10/30/2020     Lab Results   Component Value Date    NEUTROABS 4.09 10/30/2020   Lymphocytes 2700 and monocytes 740 on 10/30/2020.          IMAGING STUDY:  1.  RADIOLOGY REPORT 10/12/2018  [facility] [place] [facility]""  AGE/GENDER:  AGE: 58 Y            GENDER: M  PATIENT NAME/: BEBETO PANYDA J    : 1960  UNIT NUMBER: [id number]  ACCESSION NUMBER: [id number]  ACCOUNT:     PROCEDURE PERFORMED: DUPLEX US VENOUS EXT LOW BILATERAL    Conclusions: Right lower extremity with acute, non occlusive deep vein thrombus in the common femoral vein and acute, occlusive superficial vein thrombus in the great saphenous vein from saphenofemoral junction through mid thigh.  Left lower extremity with acute, occlusive superficial vein thrombus in the great saphenous vein from proximal thigh through the knee. No evidence of deep vein thrombus,  No prior examination for comparison.        ASSESSMENT:   1.  The patient is a 60 y.o.  male who comes in today for recurrent lower extremity DVT which he did not tell me previously about his first episodes of DVT when he was seen me in 2020.    · Nevertheless discussed with the patient,  and searched medical records, and discovered that he had first episodes of DVT in the right leg femoral vein, as well as superficial vein thrombosis, in both legs in the great saphenous veins.    · Patient reports was anticoagulated for about 6-month.  According to patient he had spreading of his right leg from his job at a time.   · Patient now has DVT in the left calf vein after long distance driving.  He currently is on Xarelto anticoagulation since August 2020 after he was diagnosed.  · I discussed with the patient today, recommend a comprehensive laboratory work-up for hypercoagulable status, including mutation for factor II and factor V Leiden, check protein C and protein S profile, Antithrombin III, and also check lupus anticoagulant, antiphospholipid antibodies for complete assessment.  Patient is agreeable.  I do recommend repeating venous Doppler study in 2 months, at the meantime he continue Xarelto anticoagulation for the time being.    2.  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    · The patient has no specific complaints, he denies recurrent bacterial infection.  Physical examination is benign, no lymphadenopathy..  · Laboratory study on 6/22/2020 showed completely normal CBC including neutrophils, lymphocytes, hemoglobin and platelet.  No evidence of disease progress.    · Lab study on 10/30/2020 reported completely normal CBC.  Continue to monitor.      PLAN:  1. Laboratory study today for comprehensive hypercoagulable status: mutation for factor II and factor V Leiden, check protein C and protein S profile, Antithrombin III, and also check lupus anticoagulant, antiphospholipid antibodies.   2. Continue Xarelto anticoagulation 20 mg daily.   3. Repeat venous Doppler study for both legs in 2 months.  4. We will bring him back in 2 months for followup evaluation, with CBC at that time.        DINESH ISRAEL M.D., Ph.D.    10/30/2020       cc:  MARY BOYD,  MD KINDRA LOCKETT M.D. - FAX: [phone] (patient's brother)

## 2020-10-31 LAB
CARDIOLIPIN IGA SER IA-ACNC: <9 APL U/ML (ref 0–11)
CARDIOLIPIN IGG SER IA-ACNC: <9 GPL U/ML (ref 0–14)
CARDIOLIPIN IGM SER IA-ACNC: 30 MPL U/ML (ref 0–12)

## 2020-11-01 LAB — PROT C AG ACT/NOR PPP IA: 96 % (ref 60–150)

## 2020-11-03 LAB
B2 GLYCOPROT1 IGA SER-ACNC: <9 GPI IGA UNITS (ref 0–25)
B2 GLYCOPROT1 IGG SER-ACNC: <9 GPI IGG UNITS (ref 0–20)
B2 GLYCOPROT1 IGM SER-ACNC: 11 GPI IGM UNITS (ref 0–32)

## 2020-11-05 LAB
PS IGA SER-ACNC: 7 APS IGA (ref 0–20)
PS IGG SER-ACNC: 4 GPS IGG (ref 0–11)
PS IGM SER-ACNC: 26 MPS IGM (ref 0–25)

## 2020-11-12 LAB
ANTI-PHOSPHATIDIC ACID: ABNORMAL
ANTI-PHOSPHATIDYL GLYCEROL: ABNORMAL
ANTI-PHOSPHATIDYL INOSITOL: ABNORMAL
ANTI-PHOSPHATIDYLETHANOLAMINE: ABNORMAL
PE IGA SER-ACNC: 2.5 U/ML
PE IGG SER-ACNC: 1.9 U/ML
PE IGM SER-ACNC: 2 U/ML
PG IGA SER-ACNC: 5.2 U/ML
PG IGG SER-ACNC: 4.4 U/ML
PG IGM SER-ACNC: 10.8 U/ML
PHOSPHATIDATE IGA SER-ACNC: 0.9 U/ML
PHOSPHATIDATE IGG SER-ACNC: 0.6 U/ML
PHOSPHATIDATE IGM SER-ACNC: 1.1 U/ML
PI IGA SER-ACNC: 6.5 U/ML
PI IGG SER-ACNC: 11.3 U/ML
PI IGM SER-ACNC: 25.8 U/ML

## 2020-11-14 LAB
APTT SCREEN TO CONFIRM RATIO: 0.91 RATIO (ref 0–1.4)
CONFIRM APTT/NORMAL: 48.7 SEC (ref 0–55)
DRVVT SCREEN TO CONFIRM RATIO: 1.8 RATIO (ref 0.8–1.2)
LA 2 SCREEN W REFLEX-IMP: ABNORMAL
MIXING DRVVT: 58.9 SEC (ref 0–47)
SCREEN APTT: 44.2 SEC (ref 0–51.9)
SCREEN DRVVT: 89.2 SEC (ref 0–47)
THROMBIN TIME: 19.9 SEC (ref 0–23)

## 2020-12-16 ENCOUNTER — HOSPITAL ENCOUNTER (OUTPATIENT)
Dept: CARDIOLOGY | Facility: HOSPITAL | Age: 60
Discharge: HOME | End: 2020-12-16
Admitting: INTERNAL MEDICINE

## 2020-12-16 DIAGNOSIS — I82.411 ACUTE DEEP VEIN THROMBOSIS (DVT) OF FEMORAL VEIN OF RIGHT LOWER EXTREMITY (HCC): ICD-10-CM

## 2020-12-16 DIAGNOSIS — I82.462 ACUTE DEEP VEIN THROMBOSIS (DVT) OF CALF MUSCLE VEIN OF LEFT LOWER EXTREMITY (HCC): ICD-10-CM

## 2020-12-16 LAB
BH CV LOW VAS LEFT GASTRONEMIUS VESSEL: 1
BH CV LOWER VASCULAR LEFT COMMON FEMORAL AUGMENT: NORMAL
BH CV LOWER VASCULAR LEFT COMMON FEMORAL COMPETENT: NORMAL
BH CV LOWER VASCULAR LEFT COMMON FEMORAL COMPRESS: NORMAL
BH CV LOWER VASCULAR LEFT COMMON FEMORAL PHASIC: NORMAL
BH CV LOWER VASCULAR LEFT COMMON FEMORAL SPONT: NORMAL
BH CV LOWER VASCULAR LEFT DISTAL FEMORAL COMPRESS: NORMAL
BH CV LOWER VASCULAR LEFT GASTRONEMIUS COMPRESS: NORMAL
BH CV LOWER VASCULAR LEFT GASTRONEMIUS THROMBUS: NORMAL
BH CV LOWER VASCULAR LEFT GREATER SAPH AK COMPRESS: NORMAL
BH CV LOWER VASCULAR LEFT GREATER SAPH BK COMPRESS: NORMAL
BH CV LOWER VASCULAR LEFT LESSER SAPH COMPRESS: NORMAL
BH CV LOWER VASCULAR LEFT MID FEMORAL AUGMENT: NORMAL
BH CV LOWER VASCULAR LEFT MID FEMORAL COMPETENT: NORMAL
BH CV LOWER VASCULAR LEFT MID FEMORAL COMPRESS: NORMAL
BH CV LOWER VASCULAR LEFT MID FEMORAL PHASIC: NORMAL
BH CV LOWER VASCULAR LEFT MID FEMORAL SPONT: NORMAL
BH CV LOWER VASCULAR LEFT PERONEAL COMPRESS: NORMAL
BH CV LOWER VASCULAR LEFT POPLITEAL AUGMENT: NORMAL
BH CV LOWER VASCULAR LEFT POPLITEAL COMPETENT: NORMAL
BH CV LOWER VASCULAR LEFT POPLITEAL COMPRESS: NORMAL
BH CV LOWER VASCULAR LEFT POPLITEAL PHASIC: NORMAL
BH CV LOWER VASCULAR LEFT POPLITEAL SPONT: NORMAL
BH CV LOWER VASCULAR LEFT POSTERIOR TIBIAL COMPRESS: NORMAL
BH CV LOWER VASCULAR LEFT PROFUNDA FEMORAL COMPRESS: NORMAL
BH CV LOWER VASCULAR LEFT PROXIMAL FEMORAL COMPRESS: NORMAL
BH CV LOWER VASCULAR LEFT SAPHENOFEMORAL JUNCTION COMPRESS: NORMAL
BH CV LOWER VASCULAR RIGHT COMMON FEMORAL AUGMENT: NORMAL
BH CV LOWER VASCULAR RIGHT COMMON FEMORAL COMPETENT: NORMAL
BH CV LOWER VASCULAR RIGHT COMMON FEMORAL COMPRESS: NORMAL
BH CV LOWER VASCULAR RIGHT COMMON FEMORAL PHASIC: NORMAL
BH CV LOWER VASCULAR RIGHT COMMON FEMORAL SPONT: NORMAL
BH CV LOWER VASCULAR RIGHT DISTAL FEMORAL COMPRESS: NORMAL
BH CV LOWER VASCULAR RIGHT GASTRONEMIUS COMPRESS: NORMAL
BH CV LOWER VASCULAR RIGHT GREATER SAPH AK COMPRESS: NORMAL
BH CV LOWER VASCULAR RIGHT GREATER SAPH BK COMPRESS: NORMAL
BH CV LOWER VASCULAR RIGHT LESSER SAPH COMPRESS: NORMAL
BH CV LOWER VASCULAR RIGHT MID FEMORAL AUGMENT: NORMAL
BH CV LOWER VASCULAR RIGHT MID FEMORAL COMPETENT: NORMAL
BH CV LOWER VASCULAR RIGHT MID FEMORAL COMPRESS: NORMAL
BH CV LOWER VASCULAR RIGHT MID FEMORAL PHASIC: NORMAL
BH CV LOWER VASCULAR RIGHT MID FEMORAL SPONT: NORMAL
BH CV LOWER VASCULAR RIGHT PERONEAL COMPRESS: NORMAL
BH CV LOWER VASCULAR RIGHT POPLITEAL AUGMENT: NORMAL
BH CV LOWER VASCULAR RIGHT POPLITEAL COMPETENT: NORMAL
BH CV LOWER VASCULAR RIGHT POPLITEAL COMPRESS: NORMAL
BH CV LOWER VASCULAR RIGHT POPLITEAL PHASIC: NORMAL
BH CV LOWER VASCULAR RIGHT POPLITEAL SPONT: NORMAL
BH CV LOWER VASCULAR RIGHT POSTERIOR TIBIAL COMPRESS: NORMAL
BH CV LOWER VASCULAR RIGHT PROFUNDA FEMORAL COMPRESS: NORMAL
BH CV LOWER VASCULAR RIGHT PROXIMAL FEMORAL COMPRESS: NORMAL
BH CV LOWER VASCULAR RIGHT SAPHENOFEMORAL JUNCTION COMPRESS: NORMAL

## 2020-12-16 PROCEDURE — 93970 EXTREMITY STUDY: CPT

## 2020-12-18 ENCOUNTER — LAB (OUTPATIENT)
Dept: LAB | Facility: HOSPITAL | Age: 60
End: 2020-12-18

## 2020-12-18 ENCOUNTER — OFFICE VISIT (OUTPATIENT)
Dept: ONCOLOGY | Facility: CLINIC | Age: 60
End: 2020-12-18

## 2020-12-18 VITALS
OXYGEN SATURATION: 99 % | TEMPERATURE: 96.3 F | SYSTOLIC BLOOD PRESSURE: 121 MMHG | BODY MASS INDEX: 27.86 KG/M2 | RESPIRATION RATE: 16 BRPM | HEIGHT: 74 IN | WEIGHT: 217.1 LBS | HEART RATE: 57 BPM | DIASTOLIC BLOOD PRESSURE: 85 MMHG

## 2020-12-18 DIAGNOSIS — C95.10 CHRONIC LEUKEMIA OF UNSPECIFIED CELL TYPE NOT HAVING ACHIEVED REMISSION (HCC): Primary | ICD-10-CM

## 2020-12-18 DIAGNOSIS — I82.562 CHRONIC DEEP VEIN THROMBOSIS (DVT) OF CALF MUSCLE VEIN OF LEFT LOWER EXTREMITY (HCC): ICD-10-CM

## 2020-12-18 LAB
BASOPHILS # BLD AUTO: 0.05 10*3/MM3 (ref 0–0.2)
BASOPHILS NFR BLD AUTO: 0.8 % (ref 0–1.5)
DEPRECATED RDW RBC AUTO: 40.6 FL (ref 37–54)
EOSINOPHIL # BLD AUTO: 0.17 10*3/MM3 (ref 0–0.4)
EOSINOPHIL NFR BLD AUTO: 2.6 % (ref 0.3–6.2)
ERYTHROCYTE [DISTWIDTH] IN BLOOD BY AUTOMATED COUNT: 12.3 % (ref 12.3–15.4)
HCT VFR BLD AUTO: 42.6 % (ref 37.5–51)
HGB BLD-MCNC: 14 G/DL (ref 13–17.7)
IMM GRANULOCYTES # BLD AUTO: 0.02 10*3/MM3 (ref 0–0.05)
IMM GRANULOCYTES NFR BLD AUTO: 0.3 % (ref 0–0.5)
LYMPHOCYTES # BLD AUTO: 2.72 10*3/MM3 (ref 0.7–3.1)
LYMPHOCYTES NFR BLD AUTO: 42 % (ref 19.6–45.3)
MCH RBC QN AUTO: 29.2 PG (ref 26.6–33)
MCHC RBC AUTO-ENTMCNC: 32.9 G/DL (ref 31.5–35.7)
MCV RBC AUTO: 88.9 FL (ref 79–97)
MONOCYTES # BLD AUTO: 0.7 10*3/MM3 (ref 0.1–0.9)
MONOCYTES NFR BLD AUTO: 10.8 % (ref 5–12)
NEUTROPHILS NFR BLD AUTO: 2.82 10*3/MM3 (ref 1.7–7)
NEUTROPHILS NFR BLD AUTO: 43.5 % (ref 42.7–76)
NRBC BLD AUTO-RTO: 0 /100 WBC (ref 0–0.2)
PLATELET # BLD AUTO: 280 10*3/MM3 (ref 140–450)
PMV BLD AUTO: 9.8 FL (ref 6–12)
RBC # BLD AUTO: 4.79 10*6/MM3 (ref 4.14–5.8)
WBC # BLD AUTO: 6.48 10*3/MM3 (ref 3.4–10.8)

## 2020-12-18 PROCEDURE — 99214 OFFICE O/P EST MOD 30 MIN: CPT | Performed by: INTERNAL MEDICINE

## 2020-12-18 PROCEDURE — 36415 COLL VENOUS BLD VENIPUNCTURE: CPT

## 2020-12-18 PROCEDURE — 85025 COMPLETE CBC W/AUTO DIFF WBC: CPT

## 2020-12-18 RX ORDER — CHLORAL HYDRATE 500 MG
CAPSULE ORAL
COMMUNITY

## 2020-12-20 NOTE — PROGRESS NOTES
REASON FOR FOLLOWUP:   1. 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    2. Recurrent lower extremity DVT.   · Right femoral vein acute DVT in August 2018, was on Xarelto for about 6 months.  · Left calf vein acute DVT discovered on 8/22/2020.  On Xarelto anticoagulation.            HISTORY OF PRESENT ILLNESS: The patient is a 60 y.o.  male presenting today for evaluation of recurrent lower extremity DVT.  He had a venous Doppler study on 12/16/2020 and is here to discuss results and further management plan.    Patient reports that he is Xarelto anticoagulation with good tolerance.  He is not having bleeding or bruising problem.    Patient reports he is otherwise doing very well, does not feel sick.  He reports excellent performance ECOG 0. No weight loss, no fever, sweating. He denies easy infection, no fever, sweating or chills.     Laboratory study today on 12/18/2020 reported complete normal CBC.    We obtain a comprehensive laboratory studies for hypercoagulable work-up on 10/30/2020.  He reported positive lupus anticoagulant, and mildly positive anticardiolipin IgM 30, and antiphosphatidylserine IgM 26, however negative for the IgG and IgA subtypes.  He was complete negative for the beta-2 glycoprotein 1 antibodies.  He was also positive for the antiphosphatidyl inositol IgM 25.8 units/mL but a negative for IgG and IgA subtypes as well as the other members of the antiphospholipid antibody panel #2.  He had a normal Antithrombin activity 111%, elevated protein C activity 150% and a normal free protein S antigen 101%, elevated protein C activity 182% and a normal protein C antigen 96%.            PAST MEDICAL HISTORY:    1. Two strep throat infections in June 2009.    2. Gastroesophageal reflux.    3. Seasonal allergies.        PAST SURGICAL HISTORY:    1. Multiple lipoma resection from his back.    2. Right ankle Achilles tendon repair.        HEMATOLOGIC/ONCOLOGIC  HISTORY: History from previous dates can be found in a separate document.        The patient was re-evaluated on 05/08/2015. The patient reports no symptoms. Physical examination was unremarkable. Laboratory study reporting normal total WBC at 7050, including lymphocytes at 3270, neutrophils 2920, and monocytes 700. Hemoglobin was normal at 14.5 and platelets at 247,000.        Laboratory studies on 11/1/2017 reports a normal total WBC at 7280, including lymphocytes at 2280, and improved neutrophils 4210, and monocytes 600.  Hemoglobin 13.7 and platelets at 255,000.      Laboratory studies on 5/2/2019 reported normal WBC 7010, including ANC 4020, lymphocytes 2180, monocytes 580, eosinophil 160.  He has normal platelets 205,000 and a normal hemoglobin 16.7 and MCV 87.2.     Laboratory studies 6/22/2020, show completely normal CBC including WBC 6920, neutrophils 3840, lymphocytes 2190 and monocytes 610.  He has normal hemoglobin 15.0, and platelets 267,000.      Patient return for reevaluation on 10/30/2020 because of recurrent lower extremity DVT.  I saw this patient in June 2020, when he did not tell me he had episodes of DVT a year earlier.  Patient recently called our office, reporting he was diagnosed of leg DVT again in August 2020 and has been taking Xarelto.  He requests to be evaluated for that matter.    Patient reports that more than a year ago, he had right leg DVT after twisting his leg from his job.  He reports anticoagulation probably for 6 months.  However he was not very clear for the, and he was not even clear for the timing of his DVT.  Upon searching medical records, I found that he was diagnosed of acute right leg DVT 2 years ago in October 2018.  He had a venous Doppler study on 10/12/2018 at the [facility], which reported acute, nonocclusive deep vein thrombosis in the common femoral vein, and acute, occlusive superficial vein thrombosis in the great/saphenous vein from the  saphenofemoral junction through the mid thigh.  There was also a left lower extremity acute occlusive superficial vein thrombosis in the great saphenous vein from the proximal thigh through the knee.  No evidence of DVT in the left leg.   There was no laboratory work-up for hypercoagulable status at that time.    Patient reports in 2020, he was driving long distance to [place], sending his child to college.  After he returned to [place], he noticed swelling and tenderness in the left leg.  He had a venous Doppler study 2020 at the [facility], which reported acute left leg DVT in the gastrocnemius vein.  All other left-sided veins appear normal.  No evidence of DVT in the right common femoral vein.      MEDICATIONS: The current medication list was reviewed with the patient and updates in the EMR this date per the medical assistant. Medication dosages and frequencies were confirmed to be accurate        ALLERGIES: No known drug allergies.        SOCIAL HISTORY: . 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 Previously worked as . The patient has no history of tobacco smoking or alcohol use. No illegal drug use, no risk for HIV.        FAMILY HISTORY: Father  of heart attack at age 67. Patient's mother is in good health at age 75 in . A brother at age 52 with unknown health condition. His two sisters are both in good health in their 40' s. The patient has 3 teenage children all in good health condition . The patient's maternal grandmother had hypertension, and colon cancer. Maternal grandfather had bone cancer, no details. Paternal grandfather had prostate cancer.        REVIEW OF SYSTEMS:   Review of Systems   Constitutional: Negative for appetite change, diaphoresis, fatigue, fever and unexpected weight change.   HENT: Negative for hearing loss and nosebleeds.    Eyes: Negative for pain and visual disturbance.   Respiratory:  "Negative for cough and shortness of breath.    Cardiovascular: Positive for leg swelling (Left lower leg). Negative for chest pain and palpitations.   Gastrointestinal: Negative for abdominal pain, blood in stool, diarrhea and nausea.        Chronic indigestion   Endocrine: Negative for cold intolerance and heat intolerance.   Genitourinary: Negative for dysuria and hematuria.   Musculoskeletal: Negative for arthralgias and joint swelling.   Skin: Negative for color change and pallor.   Allergic/Immunologic: Negative for environmental allergies.   Neurological: Negative for dizziness and numbness.   Hematological: Negative for adenopathy. Does not bruise/bleed easily.   Psychiatric/Behavioral: Negative for agitation and dysphoric mood.            VITAL SIGNS:    Vitals:    20 0924   BP: 121/85   Pulse: 57   Resp: 16   Temp: 96.3 °F (35.7 °C)   TempSrc: Temporal   SpO2: 99%   Weight: 98.5 kg (217 lb 1.6 oz)   Height: 188 cm (74.02\")   PainSc: 0-No pain   ECO          PHYSICAL EXAMINATION:    GENERAL:  Well-developed, well-nourished  male in no acute distress.   SKIN:  Warm, dry without rashes, purpura or petechiae.  EYES:  Pupils equal, round.  Conjunctivae normal.  EARS:  Hearing intact.  NOSE:  Patient wears mask due to the pandemic coronavirus infection.   MOUTH: Same as above.  THROAT: Same as above.  NECK:  Supple; no thyromegaly or masses.  CHEST: Normal respiratory effort.  Lungs clear to auscultation. Good airflow.    CARDIAC:  Regular rate and rhythm, no murmurs. Normal S1,S2.  ABDOMEN:  Soft, nontender with no hepatosplenomegaly or masses.  Bowel sounds normal.  EXTREMITIES:  No clubbing, cyanosis or edema.  None NEUROLOGICAL:  Cranial Nerves II-XII grossly intact.  No focal neurological deficits.  PSYCHIATRIC:  Normal affect and mood.       LABORATORY DATA:    Lab Results   Component Value Date    WBC 6.48 2020    HGB 14.0 2020    HCT 42.6 2020    MCV 88.9 2020    "  12/18/2020     Lab Results   Component Value Date    NEUTROABS 2.82 12/18/2020   Lymphocytes 2700 and monocytes 740 on 10/30/2020.          IMAGING STUDY:  1.  Venous Doppler study on 8/22/2020 interpretation Summary    · Acute left lower extremity deep vein thrombosis noted in the gastrocnemius.  · All other left sided veins appeared normal.            2.  Venous Doppler study on 12/16/2020 interpretation Summary    · Chronic left lower extremity deep vein thrombosis noted in the gastrocnemius.  · All other veins appeared normal bilaterally.         ASSESSMENT:   1.  The patient is a 60 y.o.  male who comes in 10/30/2020 for recurrent lower extremity DVT which he did not tell me previously about his first episodes of DVT when he was seen me in June 2020.    · Nevertheless discussed with the patient, and searched medical records, and discovered that he had first episodes of DVT in the right leg femoral vein, as well as superficial vein thrombosis, in both legs in the great saphenous veins in October 2018.    · Patient reports was anticoagulated for about 6-month.  According to patient he had injured his right leg from his job at the time.   · Patient now has DVT in the left calf vein after long distance driving.  He currently is on Xarelto anticoagulation since 8/22/2020 after he was diagnosed.  · I discussed with the patient on 10/30/2020, recommended comprehensive laboratory work-up for hypercoagulable status, including mutation for factor II and factor V Leiden, check protein C and protein S profile, Antithrombin III, and also check lupus anticoagulant, antiphospholipid antibodies for complete assessment.  Patient is agreeable.  I do recommend repeating venous Doppler study in 2 months, at the meantime he continue Xarelto anticoagulation for the time being.  · Laboratory studies on 10/30/2020 reported positive lupus anticoagulant, and also 3 mildly elevated IgM subtypes of antiphospholipid  antibodies including anticardiolipin antibody, antiphosphatidylserine antibody and antiphosphatidyl inositol antibodies.  · Today on 12/18/2020, I reviewed the laboratory results with patient, and pointed out that the mildly elevated antiphospholipid lipid antibodies could be falsely positive, caused by Xarelto anticoagulation.  These need to be repeated in the future daily of anticoagulation with NOAC.   · Also his venous Doppler study on 12/16/2020 reported chronic left calf vein thrombosis.  I recommended to continue Xarelto anticoagulation for another 6 months, and then repeat venous Doppler study for reassessment.    2.  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    · The patient has no specific complaints, he denies recurrent bacterial infection.  Physical examination is benign, no lymphadenopathy..  · Laboratory study on 6/22/2020 showed completely normal CBC including neutrophils, lymphocytes, hemoglobin and platelet.  No evidence of disease progress.   · Lab study on 10/30/2020 reported completely normal CBC.  Continue to monitor.  · Laboratory study on 12/18/2020 reported normal CBC.      PLAN:  1. Continue Xarelto anticoagulation 20 mg daily.   2. Repeat venous Doppler study for the left leg in 6 months.   3. We will bring him back in 6 months for followup evaluation, with CBC at that time.        More than 25 min were used for direct face-to-face patient care, over half of that time were for counseling.    DINESH ISRAEL M.D., Ph.D.    12/18/2020      cc:  MD KINDRA SIMONS M.D. - FAX: [phone] (patient's brother)

## 2020-12-22 LAB — F5 GENE MUT ANL BLD/T: NORMAL

## 2020-12-23 LAB — F2 GENE MUT ANL BLD/T: NORMAL

## 2021-01-05 ENCOUNTER — TELEPHONE (OUTPATIENT)
Dept: ONCOLOGY | Facility: CLINIC | Age: 61
End: 2021-01-05

## 2021-01-05 NOTE — TELEPHONE ENCOUNTER
Caller: Rachel    Relationship: Kyler Doe call back number: [phone]    What form or medical record are you requesting:Last lab and progress note 12/18    How would you like to receive the form or medical records (pick-up, mail, fax): Fax  If fax, what is the fax number: [phone]    Timeframe paperwork needed: Now    Additional notes: Pt is at their office now

## 2022-09-19 ENCOUNTER — APPOINTMENT (OUTPATIENT)
Dept: LAB | Facility: HOSPITAL | Age: 62
End: 2022-09-19

## 2022-10-07 ENCOUNTER — OFFICE VISIT (OUTPATIENT)
Dept: ONCOLOGY | Facility: CLINIC | Age: 62
End: 2022-10-07

## 2022-10-07 ENCOUNTER — LAB (OUTPATIENT)
Dept: LAB | Facility: HOSPITAL | Age: 62
End: 2022-10-07

## 2022-10-07 VITALS
HEART RATE: 65 BPM | OXYGEN SATURATION: 98 % | WEIGHT: 210.9 LBS | BODY MASS INDEX: 27.07 KG/M2 | HEIGHT: 74 IN | RESPIRATION RATE: 20 BRPM | DIASTOLIC BLOOD PRESSURE: 84 MMHG | TEMPERATURE: 97.1 F | SYSTOLIC BLOOD PRESSURE: 128 MMHG

## 2022-10-07 DIAGNOSIS — C95.10 CHRONIC LEUKEMIA OF UNSPECIFIED CELL TYPE NOT HAVING ACHIEVED REMISSION: ICD-10-CM

## 2022-10-07 DIAGNOSIS — I82.562 CHRONIC DEEP VEIN THROMBOSIS (DVT) OF CALF MUSCLE VEIN OF LEFT LOWER EXTREMITY: Primary | ICD-10-CM

## 2022-10-07 LAB
BASOPHILS # BLD AUTO: 0.05 10*3/MM3 (ref 0–0.2)
BASOPHILS NFR BLD AUTO: 0.5 % (ref 0–1.5)
DEPRECATED RDW RBC AUTO: 39.2 FL (ref 37–54)
EOSINOPHIL # BLD AUTO: 0.14 10*3/MM3 (ref 0–0.4)
EOSINOPHIL NFR BLD AUTO: 1.4 % (ref 0.3–6.2)
ERYTHROCYTE [DISTWIDTH] IN BLOOD BY AUTOMATED COUNT: 12.5 % (ref 12.3–15.4)
HCT VFR BLD AUTO: 42 % (ref 37.5–51)
HGB BLD-MCNC: 14.5 G/DL (ref 13–17.7)
IMM GRANULOCYTES # BLD AUTO: 0.03 10*3/MM3 (ref 0–0.05)
IMM GRANULOCYTES NFR BLD AUTO: 0.3 % (ref 0–0.5)
LYMPHOCYTES # BLD AUTO: 2.36 10*3/MM3 (ref 0.7–3.1)
LYMPHOCYTES NFR BLD AUTO: 23.8 % (ref 19.6–45.3)
MCH RBC QN AUTO: 29.7 PG (ref 26.6–33)
MCHC RBC AUTO-ENTMCNC: 34.5 G/DL (ref 31.5–35.7)
MCV RBC AUTO: 85.9 FL (ref 79–97)
MONOCYTES # BLD AUTO: 0.83 10*3/MM3 (ref 0.1–0.9)
MONOCYTES NFR BLD AUTO: 8.4 % (ref 5–12)
NEUTROPHILS NFR BLD AUTO: 6.51 10*3/MM3 (ref 1.7–7)
NEUTROPHILS NFR BLD AUTO: 65.6 % (ref 42.7–76)
NRBC BLD AUTO-RTO: 0 /100 WBC (ref 0–0.2)
PLATELET # BLD AUTO: 205 10*3/MM3 (ref 140–450)
PMV BLD AUTO: 10.5 FL (ref 6–12)
RBC # BLD AUTO: 4.89 10*6/MM3 (ref 4.14–5.8)
WBC NRBC COR # BLD: 9.92 10*3/MM3 (ref 3.4–10.8)

## 2022-10-07 PROCEDURE — 99214 OFFICE O/P EST MOD 30 MIN: CPT | Performed by: INTERNAL MEDICINE

## 2022-10-07 PROCEDURE — 85025 COMPLETE CBC W/AUTO DIFF WBC: CPT

## 2022-10-07 PROCEDURE — 36415 COLL VENOUS BLD VENIPUNCTURE: CPT

## 2022-10-07 RX ORDER — EVOLOCUMAB 140 MG/ML
140 INJECTION, SOLUTION SUBCUTANEOUS
COMMUNITY
Start: 2022-09-08

## 2022-10-07 RX ORDER — ZINC SULFATE 50(220)MG
220 CAPSULE ORAL DAILY
COMMUNITY

## 2022-10-07 RX ORDER — PREDNISONE 10 MG/1
10 TABLET ORAL
COMMUNITY
Start: 2022-09-19

## 2022-10-07 RX ORDER — ESCITALOPRAM OXALATE 10 MG/1
10 TABLET ORAL DAILY
COMMUNITY
Start: 2022-09-06

## 2022-10-07 NOTE — PROGRESS NOTES
REASON FOR FOLLOWUP:   1. 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    2. Recurrent lower extremity DVT.   · Right femoral vein acute DVT in August 2018, was on Xarelto for about 6 months.  · Left calf vein acute DVT discovered on 8/22/2020.  On Xarelto anticoagulation.  · Lab study in October 2021 reported positive for lupus anticoagulant, and mildly elevated antiphospholipid antibodies.            HISTORY OF PRESENT ILLNESS: The patient is a 62 y.o.  male presenting today for evaluation of the above problems.     Patient has not been here for almost 2 years, his last visit was on 12/18/2020.  He presented for evaluation at the request of his primary care physician Dr. Alfaro.    Patient reports he is at baseline condition, continues on Xarelto anticoagulation 20 mg daily.  He denies bleeding or bruising.  Continues have some mild left leg edema, however no calf tenderness.  Denies chest pain or dyspnea.    Patient reports no fever sweating or chills.  No recurrent bacterial infection.    Laboratory study today on 10/7/2022 reported normal WBC 9920, including ANC 6510, lymphocytes 2360 and monocytes 830.        PAST MEDICAL HISTORY:    1. Two strep throat infections in June 2009.    2. Gastroesophageal reflux.    3. Seasonal allergies.        PAST SURGICAL HISTORY:    1. Multiple lipoma resection from his back.    2. Right ankle Achilles tendon repair.        HEMATOLOGIC/ONCOLOGIC HISTORY: History from previous dates can be found in a separate document, see our note from 2/28/2011.        Mr. Olson was a 49-year-old  male who was found to have mildly elevated lymphocytes of 5000 on 7/23/09 when he had total WBC of 8300, neutrophils were 2400, platelets  were 224,000 and hemoglobin was 14.9.  Chemistry lab that day was unremarkable with normal renal function, liver function.  Hepatitis A, B and C testing were all negativ  e   that day.  The patient had repeat  labs on 7/29/09 with total WBC of 8700, neutrophil of 3700 and lymphocytes of 3300.  The patient was positive for strep screen at that time.  Peripheral blood flow cytometry study showed abnormal T cell phenotype which w  as positive for CD2, CD3, CD5 dim, CD7 dim, CD8, CD38, CD57 and HLA-DR dim but negative for CD4, CD16 and CD56. The abnormal cells account for about 23% of the whole leukocyte population.  TCR gene rearrangement by PCR test was positive as reported by [facility].  It was negative for bcr/abl rearrangement.      The patient was referred to our office for initial consultation on 8/24/09. The patient is in his baseline health condition.  He does not feel sick.  The patient looks normal by visual examination.    Physical examination does not show any enlarged lymph nodes, hepatomegaly or splenomegaly.  CBC today in the office showed total WBC of 6590 with neutrophils 1880 and lymphocytes of 3800.  Platelets are normal at 174,000 with normal hemoglobin 14.1.  Per  ipheral blood smear reviewed and there are no signs for malignancy, except slightly increased amount of large granulocytic lymphocytes.          This was discussed with the patient and bone marrow aspiration and biopsy was obtained after consent was signed.  We w  ill also schedule patient for CT scan of neck, chest, abdomen and pelvis with IV contrast to rule out any possible lymphadenopathy or organomegaly.          Bone marrow aspiration and biopsy performed 8/24/09 showing normal cellular bone marrow, with abnormal T   cell population 20%, positive for CD3, CD8, CD57, dim for CD5, CD2 and CD7 fitting with T cell large granular lymphocytic leukemia.  Cytogenetic studies showed normal L karyotype.        Patient had CT scan of neck, chest, abdomen or pelvis on 8/31/09 which virtually is unremarkable.  No lymphadenopathy, no splenomegaly, no hepatomegaly.        The patient was re-evaluated on 05/08/2015. The patient reports no  symptoms. Physical examination was unremarkable. Laboratory study reporting normal total WBC at 7050, including lymphocytes at 3270, neutrophils 2920, and monocytes 700. Hemoglobin was normal at 14.5 and platelets at 247,000.        Laboratory studies on 11/1/2017 reports a normal total WBC at 7280, including lymphocytes at 2280, and improved neutrophils 4210, and monocytes 600.  Hemoglobin 13.7 and platelets at 255,000.      Laboratory studies on 5/2/2019 reported normal WBC 7010, including ANC 4020, lymphocytes 2180, monocytes 580, eosinophil 160.  He has normal platelets 205,000 and a normal hemoglobin 16.7 and MCV 87.2.     Laboratory studies 6/22/2020, show completely normal CBC including WBC 6920, neutrophils 3840, lymphocytes 2190 and monocytes 610.  He has normal hemoglobin 15.0, and platelets 267,000.      Patient return for reevaluation on 10/30/2020 because of recurrent lower extremity DVT.  I saw this patient in June 2020, when he did not tell me he had episodes of DVT a year earlier.  Patient recently called our office, reporting he was diagnosed of leg DVT again in August 2020 and has been taking Xarelto.  He requests to be evaluated for that matter.    Patient reports that more than a year ago, he had right leg DVT after twisting his leg from his job.  He reports anticoagulation probably for 6 months.  However he was not very clear for the, and he was not even clear for the timing of his DVT.  Upon searching medical records, I found that he was diagnosed of acute right leg DVT 2 years ago in October 2018.  He had a venous Doppler study on 10/12/2018 at the [facility], which reported acute, nonocclusive deep vein thrombosis in the common femoral vein, and acute, occlusive superficial vein thrombosis in the great/saphenous vein from the saphenofemoral junction through the mid thigh.  There was also a left lower extremity acute occlusive superficial vein thrombosis in the great saphenous  vein from the proximal thigh through the knee.  No evidence of DVT in the left leg.   There was no laboratory work-up for hypercoagulable status at that time.    Patient reports in 2020, he was driving long distance to [place], sending his child to college.  After he returned to [place], he noticed swelling and tenderness in the left leg.  He had a venous Doppler study 2020 at the [facility], which reported acute left leg DVT in the gastrocnemius vein.  All other left-sided veins appear normal.  No evidence of DVT in the right common femoral vein.    We obtain comprehensive laboratory studies for hypercoagulable work-up on 10/30/2020.  He reported positive lupus anticoagulant, and mildly positive anticardiolipin IgM 30, and antiphosphatidylserine IgM 26, however negative for the IgG and IgA subtypes.  He was complete negative for the beta-2 glycoprotein 1 antibodies.  He was also positive for the antiphosphatidyl inositol IgM 25.8 units/mL but negative for IgG and IgA subtypes as well as the other members of the antiphospholipid antibody panel #2.  He had normal Antithrombin activity 111%, elevated protein S activity 150% and normal protein S free antigen 101%, elevated protein C activity 182% and normal protein C antigen 96%.        MEDICATIONS: The current medication list was reviewed with the patient and updates in the EMR this date per the medical assistant. Medication dosages and frequencies were confirmed to be accurate        ALLERGIES: No known drug allergies.        SOCIAL HISTORY: . 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 Previously worked as . The patient has no history of tobacco smoking or alcohol use. No illegal drug use, no risk for HIV.        FAMILY HISTORY: Father  of heart attack at age 67. Patient's mother is in good health at age 75 in . A brother at age 52 with unknown health condition. His two  "sisters are both in good health in their 40' s. The patient has 3 teenage children all in good health condition . The patient's maternal grandmother had hypertension, and colon cancer. Maternal grandfather had bone cancer, no details. Paternal grandfather had prostate cancer.              VITAL SIGNS:    Vitals:    10/07/22 1506   BP: 128/84   Pulse: 65   Resp: 20   Temp: 97.1 °F (36.2 °C)   TempSrc: Temporal   SpO2: 98%   Weight: 95.7 kg (210 lb 14.4 oz)   Height: 188 cm (74.02\")   PainSc:   7   PainLoc: Shoulder  Comment: Right arm   ECO          PHYSICAL EXAMINATION:    GENERAL:  Well-developed, well-nourished male in no acute distress.  Orientated to time place and the people.  SKIN:  Warm, dry without rashes, purpura or petechiae.  HEENT:  Normocephalic.  Wearing mask.   LYMPHATICS:  No cervical, supraclavicular adenopathy.  CHEST: Normal respiratory effort.  Lungs clear to auscultation. Good airflow.  CARDIAC:  Regular rate and rhythm. Normal S1,S2.  ABDOMEN:  Soft, nontender with no organomegaly or masses.  Bowel sounds normal.  EXTREMITIES: Trace edema of the left leg.  No edema on the right leg.    NEUROLOGICAL:  Grossly intact.    PSYCHIATRIC:  Normal affect and mood.        LABORATORY DATA:    Lab Results   Component Value Date    WBC 9.92 10/07/2022    HGB 14.5 10/07/2022    HCT 42.0 10/07/2022    MCV 85.9 10/07/2022     10/07/2022     Lab Results   Component Value Date    NEUTROABS 6.51 10/07/2022   Lymphocytes 2360 and monocytes 839 on 10/7/2022.           IMAGING STUDY:  1.  Venous Doppler study on 2020 interpretation Summary    · Acute left lower extremity deep vein thrombosis noted in the gastrocnemius.  · All other left sided veins appeared normal.          2.  Venous Doppler study on 2020 interpretation Summary    · Chronic left lower extremity deep vein thrombosis noted in the gastrocnemius.  · All other veins appeared normal bilaterally.         ASSESSMENT:   1.  The " patient is a 62 y.o.  male who comes in 10/30/2020 for recurrent lower extremity DVT which he did not tell me previously about his first episodes of DVT when he saw me in June 2020.    · Nevertheless discussed with the patient, and searched medical records, and discovered that he had first episode of DVT in the right leg femoral vein, as well as superficial vein thrombosis in both legs in the great saphenous veins in October 2018.    · Patient reports was anticoagulated for about 6-month.  According to patient he had injured his right leg from his job at the time.   · Patient now has DVT in the left calf vein after long distance driving.  He currently is on Xarelto anticoagulation since 8/22/2020 after he was diagnosed.  · I discussed with the patient on 10/30/2020, recommended comprehensive laboratory work-up for hypercoagulable status, including mutation for factor II and factor V Leiden, check protein C and protein S profile, Antithrombin III, and also check lupus anticoagulant, antiphospholipid antibodies for complete assessment.  Patient is agreeable.  I do recommend repeating venous Doppler study in 2 months, at the meantime he continue Xarelto anticoagulation for the time being.  · Laboratory studies on 10/30/2020 reported positive lupus anticoagulant, and also 3 mildly elevated IgM subtypes of antiphospholipid antibodies including anticardiolipin antibody, antiphosphatidylserine antibody and antiphosphatidyl inositol antibodies.  · Today on 12/18/2020, I reviewed the laboratory results with patient, and pointed out that the mildly elevated antiphospholipid lipid antibodies could be falsely positive, caused by Xarelto anticoagulation.  These need to be repeated in the future daily of anticoagulation with NOAC.   · Also his venous Doppler study on 12/16/2020 reported chronic left calf vein thrombosis.  I recommended to continue Xarelto anticoagulation for another 6 months, and then repeat venous  Doppler study for reassessment.  · Patient did not return for evaluation until 10/7/2022.  Patient continues to have persistent left leg edema, denies chest pain or dyspnea.  He is compliant with Xarelto anticoagulation milligrams daily.  Patient reports he had a venous Doppler study at his primary care physician Dr. Alfaro's office not long ago.  We will try to get that the report.  For now he will continue Xarelto anticoagulation.    2.  Asymptomatic T-cell large granular lymphocytic leukemia, diagnosed in 2009.    · The patient has no specific complaints, he denies recurrent bacterial infection.  Physical examination is benign, no lymphadenopathy..  · Laboratory study on 6/22/2020 showed completely normal CBC including neutrophils, lymphocytes, hemoglobin and platelet.  No evidence of disease progress.   · Lab study on 10/30/2020 reported completely normal CBC.  Continue to monitor.  · Laboratory study on 12/18/2020 reported normal CBC.  · Today on 10/7/2022 laboratory studies reported normal CBC including normal ANC and lymphocytes.  No evidence for disease progression.      PLAN:  1. Continue Xarelto anticoagulation 20 mg daily.   2. We will try to obtain report for venous Doppler study from his primary care's physician Dr. Alfaro's office.  3. Patient will come back to see me again in 12 months for reevaluation.  We will check CBC and CMP.       Reviewed the laboratory results with the patient, he voiced understanding and agreeable.    DINESH ISRAEL M.D., Ph.D.    10/7/2022.      cc:  MD KINDRA SIMONS M.D. - FAX: [phone] (patient's brother)

## 2023-05-05 ENCOUNTER — OFFICE (AMBULATORY)
Dept: URBAN - METROPOLITAN AREA CLINIC 76 | Facility: CLINIC | Age: 63
End: 2023-05-05
Payer: COMMERCIAL

## 2023-05-05 VITALS
HEART RATE: 73 BPM | WEIGHT: 204 LBS | OXYGEN SATURATION: 98 % | HEIGHT: 74 IN | SYSTOLIC BLOOD PRESSURE: 122 MMHG | DIASTOLIC BLOOD PRESSURE: 84 MMHG

## 2023-05-05 DIAGNOSIS — Z86.010 PERSONAL HISTORY OF COLONIC POLYPS: ICD-10-CM

## 2023-05-05 DIAGNOSIS — R13.10 DYSPHAGIA, UNSPECIFIED: ICD-10-CM

## 2023-05-05 DIAGNOSIS — Z12.11 ENCOUNTER FOR SCREENING FOR MALIGNANT NEOPLASM OF COLON: ICD-10-CM

## 2023-05-05 DIAGNOSIS — Z79.01 LONG TERM (CURRENT) USE OF ANTICOAGULANTS: ICD-10-CM

## 2023-05-05 DIAGNOSIS — K62.5 HEMORRHAGE OF ANUS AND RECTUM: ICD-10-CM

## 2023-05-05 DIAGNOSIS — K21.9 GASTRO-ESOPHAGEAL REFLUX DISEASE WITHOUT ESOPHAGITIS: ICD-10-CM

## 2023-05-05 PROBLEM — K57.30 DVRTCLOS OF LG INT W/O PERFORATION OR ABSCESS W/O BLEEDING: Status: ACTIVE | Noted: 2018-06-21

## 2023-05-05 PROBLEM — K64.8 OTHER HEMORRHOIDS: Status: ACTIVE | Noted: 2018-06-21

## 2023-05-05 PROBLEM — D12.5 BENIGN NEOPLASM OF SIGMOID COLON: Status: ACTIVE | Noted: 2018-06-21

## 2023-05-05 PROCEDURE — 99204 OFFICE O/P NEW MOD 45 MIN: CPT | Performed by: INTERNAL MEDICINE

## 2023-05-05 NOTE — SERVICENOTES
previous records reviewed.  CT scan done in March unremarkable.  Lab work unremarkable, CBC, CMP lipase normal.

## 2023-05-05 NOTE — SERVICEHPINOTES
I have not seen Mister Lucas in some time.  As you know he is a pleasant 62-year-old gentleman who does have a history of polyps.  He is due for colonoscopy.  He tells me he just had a recent workup for hematuria which was negative.  He does take Xarelto.  He also has intermittent rectal bleeding with bowel movements, this occurs with hard stools.  Bleeding is consistent with hemorrhoids in his bleeding is worsened because of Xarelto.  Again he has a personal history of polyps, his mother had polyps but he is not sure what age.  He did have CT scan done in March as part of his hematuria workup, CT scan was negative.  He is not anemic.
br
br He does complain of reflux, symptoms are controlled with daily PPI therapy but he also says every few weeks so have trouble swallowing solids.  He mentions meats.  There is no chest pain, there is no nausea or vomiting.  There is no melena or hematemesis.  He does not smoke or drink.  He is not on any nonsteroidals.  He is in no acute distress.

## 2023-06-06 VITALS
HEART RATE: 56 BPM | DIASTOLIC BLOOD PRESSURE: 69 MMHG | TEMPERATURE: 97.3 F | HEART RATE: 63 BPM | RESPIRATION RATE: 14 BRPM | DIASTOLIC BLOOD PRESSURE: 60 MMHG | HEART RATE: 64 BPM | SYSTOLIC BLOOD PRESSURE: 122 MMHG | RESPIRATION RATE: 16 BRPM | HEART RATE: 54 BPM | DIASTOLIC BLOOD PRESSURE: 73 MMHG | WEIGHT: 204 LBS | OXYGEN SATURATION: 99 % | HEART RATE: 55 BPM | SYSTOLIC BLOOD PRESSURE: 104 MMHG | TEMPERATURE: 97 F | DIASTOLIC BLOOD PRESSURE: 82 MMHG | OXYGEN SATURATION: 98 % | OXYGEN SATURATION: 100 % | RESPIRATION RATE: 12 BRPM | DIASTOLIC BLOOD PRESSURE: 68 MMHG | SYSTOLIC BLOOD PRESSURE: 103 MMHG | DIASTOLIC BLOOD PRESSURE: 61 MMHG | DIASTOLIC BLOOD PRESSURE: 67 MMHG | RESPIRATION RATE: 15 BRPM | SYSTOLIC BLOOD PRESSURE: 99 MMHG | SYSTOLIC BLOOD PRESSURE: 127 MMHG | SYSTOLIC BLOOD PRESSURE: 119 MMHG | SYSTOLIC BLOOD PRESSURE: 91 MMHG | DIASTOLIC BLOOD PRESSURE: 74 MMHG | RESPIRATION RATE: 10 BRPM | SYSTOLIC BLOOD PRESSURE: 135 MMHG | HEART RATE: 60 BPM | HEART RATE: 58 BPM | DIASTOLIC BLOOD PRESSURE: 72 MMHG | DIASTOLIC BLOOD PRESSURE: 63 MMHG | DIASTOLIC BLOOD PRESSURE: 66 MMHG | DIASTOLIC BLOOD PRESSURE: 79 MMHG | HEART RATE: 59 BPM | TEMPERATURE: 96.9 F | HEIGHT: 74 IN | SYSTOLIC BLOOD PRESSURE: 101 MMHG | RESPIRATION RATE: 11 BRPM | HEART RATE: 57 BPM

## 2023-06-12 ENCOUNTER — OFFICE (AMBULATORY)
Dept: URBAN - METROPOLITAN AREA PATHOLOGY 4 | Facility: PATHOLOGY | Age: 63
End: 2023-06-12

## 2023-06-12 ENCOUNTER — AMBULATORY SURGICAL CENTER (AMBULATORY)
Dept: URBAN - METROPOLITAN AREA SURGERY 17 | Facility: SURGERY | Age: 63
End: 2023-06-12

## 2023-06-12 DIAGNOSIS — K31.7 POLYP OF STOMACH AND DUODENUM: ICD-10-CM

## 2023-06-12 DIAGNOSIS — K44.9 DIAPHRAGMATIC HERNIA WITHOUT OBSTRUCTION OR GANGRENE: ICD-10-CM

## 2023-06-12 DIAGNOSIS — Z86.010 PERSONAL HISTORY OF COLONIC POLYPS: ICD-10-CM

## 2023-06-12 DIAGNOSIS — K29.60 OTHER GASTRITIS WITHOUT BLEEDING: ICD-10-CM

## 2023-06-12 DIAGNOSIS — K29.70 GASTRITIS, UNSPECIFIED, WITHOUT BLEEDING: ICD-10-CM

## 2023-06-12 DIAGNOSIS — R13.10 DYSPHAGIA, UNSPECIFIED: ICD-10-CM

## 2023-06-12 DIAGNOSIS — K22.2 ESOPHAGEAL OBSTRUCTION: ICD-10-CM

## 2023-06-12 DIAGNOSIS — K21.00 GASTRO-ESOPHAGEAL REFLUX DISEASE WITH ESOPHAGITIS, WITHOUT B: ICD-10-CM

## 2023-06-12 DIAGNOSIS — K57.30 DIVERTICULOSIS OF LARGE INTESTINE WITHOUT PERFORATION OR ABS: ICD-10-CM

## 2023-06-12 PROBLEM — K31.89 OTHER DISEASES OF STOMACH AND DUODENUM: Status: ACTIVE | Noted: 2023-06-12

## 2023-06-12 LAB
GI HISTOLOGY: A. SELECT: (no result)
GI HISTOLOGY: B. UNSPECIFIED: (no result)
GI HISTOLOGY: C. SELECT: (no result)
GI HISTOLOGY: PDF REPORT: (no result)

## 2023-06-12 PROCEDURE — 88305 TISSUE EXAM BY PATHOLOGIST: CPT | Performed by: INTERNAL MEDICINE

## 2023-06-12 PROCEDURE — 43239 EGD BIOPSY SINGLE/MULTIPLE: CPT | Mod: 59 | Performed by: INTERNAL MEDICINE

## 2023-06-12 PROCEDURE — 45378 DIAGNOSTIC COLONOSCOPY: CPT | Mod: 33 | Performed by: INTERNAL MEDICINE

## 2023-06-12 PROCEDURE — 88342 IMHCHEM/IMCYTCHM 1ST ANTB: CPT | Performed by: INTERNAL MEDICINE

## 2023-06-12 PROCEDURE — 43249 ESOPH EGD DILATION <30 MM: CPT | Performed by: INTERNAL MEDICINE

## 2023-06-12 NOTE — SERVICEHPINOTES
I have not seen Mister Lucas in some time.  As you know he is a pleasant 62-year-old gentleman who does have a history of polyps.  He is due for colonoscopy.  He tells me he just had a recent workup for hematuria which was negative.  He does take Xarelto.  He also has intermittent rectal bleeding with bowel movements, this occurs with hard stools.  Bleeding is consistent with hemorrhoids in his bleeding is worsened because of Xarelto.  Again he has a personal history of polyps, his mother had polyps but he is not sure what age.  He did have CT scan done in March as part of his hematuria workup, CT scan was negative.  He is not anemic.He does complain of reflux, symptoms are controlled with daily PPI therapy but he also says every few weeks so have trouble swallowing solids.  He mentions meats.  There is no chest pain, there is no nausea or vomiting.  There is no melena or hematemesis.  He does not smoke or drink.  He is not on any nonsteroidals.  He is in no acute distress.

## 2023-08-24 ENCOUNTER — OFFICE (AMBULATORY)
Dept: URBAN - METROPOLITAN AREA CLINIC 76 | Facility: CLINIC | Age: 63
End: 2023-08-24
Payer: COMMERCIAL

## 2023-08-24 VITALS
HEART RATE: 69 BPM | HEIGHT: 74 IN | OXYGEN SATURATION: 95 % | DIASTOLIC BLOOD PRESSURE: 74 MMHG | SYSTOLIC BLOOD PRESSURE: 96 MMHG | WEIGHT: 196 LBS

## 2023-08-24 DIAGNOSIS — K44.9 DIAPHRAGMATIC HERNIA WITHOUT OBSTRUCTION OR GANGRENE: ICD-10-CM

## 2023-08-24 DIAGNOSIS — Z86.010 PERSONAL HISTORY OF COLONIC POLYPS: ICD-10-CM

## 2023-08-24 DIAGNOSIS — K57.30 DIVERTICULOSIS OF LARGE INTESTINE WITHOUT PERFORATION OR ABS: ICD-10-CM

## 2023-08-24 DIAGNOSIS — K21.9 GASTRO-ESOPHAGEAL REFLUX DISEASE WITHOUT ESOPHAGITIS: ICD-10-CM

## 2023-08-24 PROCEDURE — 99213 OFFICE O/P EST LOW 20 MIN: CPT

## 2023-10-06 ENCOUNTER — LAB (OUTPATIENT)
Dept: LAB | Facility: HOSPITAL | Age: 63
End: 2023-10-06
Payer: COMMERCIAL

## 2023-10-06 ENCOUNTER — OFFICE VISIT (OUTPATIENT)
Dept: ONCOLOGY | Facility: CLINIC | Age: 63
End: 2023-10-06
Payer: COMMERCIAL

## 2023-10-06 VITALS
WEIGHT: 196.5 LBS | OXYGEN SATURATION: 96 % | SYSTOLIC BLOOD PRESSURE: 121 MMHG | DIASTOLIC BLOOD PRESSURE: 90 MMHG | TEMPERATURE: 98.6 F | HEIGHT: 74 IN | BODY MASS INDEX: 25.22 KG/M2 | HEART RATE: 74 BPM

## 2023-10-06 DIAGNOSIS — C95.10 CHRONIC LEUKEMIA OF UNSPECIFIED CELL TYPE NOT HAVING ACHIEVED REMISSION: ICD-10-CM

## 2023-10-06 DIAGNOSIS — I82.562 CHRONIC DEEP VEIN THROMBOSIS (DVT) OF CALF MUSCLE VEIN OF LEFT LOWER EXTREMITY: Primary | ICD-10-CM

## 2023-10-06 DIAGNOSIS — I82.562 CHRONIC DEEP VEIN THROMBOSIS (DVT) OF CALF MUSCLE VEIN OF LEFT LOWER EXTREMITY: ICD-10-CM

## 2023-10-06 LAB
ALBUMIN SERPL-MCNC: 4.3 G/DL (ref 3.5–5.2)
ALBUMIN/GLOB SERPL: 1.7 G/DL
ALP SERPL-CCNC: 90 U/L (ref 39–117)
ALT SERPL W P-5'-P-CCNC: 14 U/L (ref 1–41)
ANION GAP SERPL CALCULATED.3IONS-SCNC: 13.8 MMOL/L (ref 5–15)
AST SERPL-CCNC: 18 U/L (ref 1–40)
BASOPHILS # BLD AUTO: 0.04 10*3/MM3 (ref 0–0.2)
BASOPHILS NFR BLD AUTO: 0.5 % (ref 0–1.5)
BILIRUB SERPL-MCNC: 0.5 MG/DL (ref 0–1.2)
BUN SERPL-MCNC: 19 MG/DL (ref 8–23)
BUN/CREAT SERPL: 19.4 (ref 7–25)
CALCIUM SPEC-SCNC: 9 MG/DL (ref 8.6–10.5)
CHLORIDE SERPL-SCNC: 104 MMOL/L (ref 98–107)
CO2 SERPL-SCNC: 22.2 MMOL/L (ref 22–29)
CREAT SERPL-MCNC: 0.98 MG/DL (ref 0.7–1.3)
DEPRECATED RDW RBC AUTO: 39.7 FL (ref 37–54)
EGFRCR SERPLBLD CKD-EPI 2021: 87.2 ML/MIN/1.73
EOSINOPHIL # BLD AUTO: 0.13 10*3/MM3 (ref 0–0.4)
EOSINOPHIL NFR BLD AUTO: 1.7 % (ref 0.3–6.2)
ERYTHROCYTE [DISTWIDTH] IN BLOOD BY AUTOMATED COUNT: 12.2 % (ref 12.3–15.4)
GLOBULIN UR ELPH-MCNC: 2.5 GM/DL
GLUCOSE SERPL-MCNC: 91 MG/DL (ref 65–99)
HCT VFR BLD AUTO: 42 % (ref 37.5–51)
HGB BLD-MCNC: 14.1 G/DL (ref 13–17.7)
IMM GRANULOCYTES # BLD AUTO: 0.03 10*3/MM3 (ref 0–0.05)
IMM GRANULOCYTES NFR BLD AUTO: 0.4 % (ref 0–0.5)
LYMPHOCYTES # BLD AUTO: 2.22 10*3/MM3 (ref 0.7–3.1)
LYMPHOCYTES NFR BLD AUTO: 29.6 % (ref 19.6–45.3)
MCH RBC QN AUTO: 29.7 PG (ref 26.6–33)
MCHC RBC AUTO-ENTMCNC: 33.6 G/DL (ref 31.5–35.7)
MCV RBC AUTO: 88.6 FL (ref 79–97)
MONOCYTES # BLD AUTO: 0.64 10*3/MM3 (ref 0.1–0.9)
MONOCYTES NFR BLD AUTO: 8.5 % (ref 5–12)
NEUTROPHILS NFR BLD AUTO: 4.44 10*3/MM3 (ref 1.7–7)
NEUTROPHILS NFR BLD AUTO: 59.3 % (ref 42.7–76)
NRBC BLD AUTO-RTO: 0 /100 WBC (ref 0–0.2)
PLATELET # BLD AUTO: 214 10*3/MM3 (ref 140–450)
PMV BLD AUTO: 10.3 FL (ref 6–12)
POTASSIUM SERPL-SCNC: 4.1 MMOL/L (ref 3.5–5.2)
PROT SERPL-MCNC: 6.8 G/DL (ref 6–8.5)
RBC # BLD AUTO: 4.74 10*6/MM3 (ref 4.14–5.8)
SODIUM SERPL-SCNC: 140 MMOL/L (ref 136–145)
WBC NRBC COR # BLD: 7.5 10*3/MM3 (ref 3.4–10.8)

## 2023-10-06 PROCEDURE — 80053 COMPREHEN METABOLIC PANEL: CPT

## 2023-10-06 PROCEDURE — 85025 COMPLETE CBC W/AUTO DIFF WBC: CPT

## 2023-10-06 PROCEDURE — 36415 COLL VENOUS BLD VENIPUNCTURE: CPT

## 2023-10-06 NOTE — PROGRESS NOTES
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    Recurrent lower extremity DVT.   Right femoral vein acute DVT in August 2018, was on Xarelto for about 6 months.  Left calf vein acute DVT discovered on 8/22/2020.  On Xarelto anticoagulation.  Lab study in October 2021 reported positive for lupus anticoagulant, and mildly elevated antiphospholipid antibodies.            HISTORY OF PRESENT ILLNESS: The patient is a 63 y.o.  male, who presented today on 10/06/2023 for annual follow-up evaluation.     Patient reports that he recently had an issue with his right arm. It began after lifting a heavy log. Subsequently, he developed numbness and tingling involving the right hand and was seen by an orthopedic surgeon and had an MRI examination of the spine and was found to have some herniated discs. He will continue to follow up with orthopedic surgeon, and he may need surgical intervention.    Patient otherwise has been tolerating Xarelto anticoagulation as prescribed by his primary care physician Dr. Alfaro. He denies any easy bleeding or bruising. He denies fever, sweating, chills.    Laboratory study today on 10/6/2023 showed a normal CBC including normal neutrophils 4440, lymphocytes 2,220 out of total WBC 7500, normal hemoglobin 14.1 and platelets 214,000. Chemistry lab reported unremarkable CMP including normal renal function, liver function panel, and normal electrolytes, glucose 91.      Review of Systems   Constitutional:  Negative for chills, diaphoresis and fever.   Musculoskeletal:         Paresthesia of right right hand.   Hematological:  Does not bruise/bleed easily.       PAST MEDICAL HISTORY:    Two strep throat infections in June 2009.    Gastroesophageal reflux.    Seasonal allergies.        PAST SURGICAL HISTORY:    Multiple lipoma resection from his back.    Right ankle Achilles tendon repair.        HEMATOLOGIC/ONCOLOGIC HISTORY: History from  previous dates can be found in a separate document, see our note from 2/28/2011.        Mr. Olson was a 49-year-old  male who was found to have mildly elevated lymphocytes of 5000 on 7/23/09 when he had total WBC of 8300, neutrophils were 2400, platelets  were 224,000 and hemoglobin was 14.9.  Chemistry lab that day was unremarkable with normal renal function, liver function.  Hepatitis A, B and C testing were all negativ  e   that day.  The patient had repeat labs on 7/29/09 with total WBC of 8700, neutrophil of 3700 and lymphocytes of 3300.  The patient was positive for strep screen at that time.  Peripheral blood flow cytometry study showed abnormal T cell phenotype which w  as positive for CD2, CD3, CD5 dim, CD7 dim, CD8, CD38, CD57 and HLA-DR dim but negative for CD4, CD16 and CD56. The abnormal cells account for about 23% of the whole leukocyte population.  TCR gene rearrangement by PCR test was positive as reported by [facility].  It was negative for bcr/abl rearrangement.      The patient was referred to our office for initial consultation on 8/24/09. The patient is in his baseline health condition.  He does not feel sick.  The patient looks normal by visual examination.    Physical examination does not show any enlarged lymph nodes, hepatomegaly or splenomegaly.  CBC today in the office showed total WBC of 6590 with neutrophils 1880 and lymphocytes of 3800.  Platelets are normal at 174,000 with normal hemoglobin 14.1.  Per  ipheral blood smear reviewed and there are no signs for malignancy, except slightly increased amount of large granulocytic lymphocytes.          This was discussed with the patient and bone marrow aspiration and biopsy was obtained after consent was signed.  We w  ill also schedule patient for CT scan of neck, chest, abdomen and pelvis with IV contrast to rule out any possible lymphadenopathy or organomegaly.          Bone marrow aspiration and biopsy performed  8/24/09 showing normal cellular bone marrow, with abnormal T   cell population 20%, positive for CD3, CD8, CD57, dim for CD5, CD2 and CD7 fitting with T cell large granular lymphocytic leukemia.  Cytogenetic studies showed normal L karyotype.        Patient had CT scan of neck, chest, abdomen or pelvis on 8/31/09 which virtually is unremarkable.  No lymphadenopathy, no splenomegaly, no hepatomegaly.        The patient was re-evaluated on 05/08/2015. The patient reports no symptoms. Physical examination was unremarkable. Laboratory study reporting normal total WBC at 7050, including lymphocytes at 3270, neutrophils 2920, and monocytes 700. Hemoglobin was normal at 14.5 and platelets at 247,000.        Laboratory studies on 11/1/2017 reports a normal total WBC at 7280, including lymphocytes at 2280, and improved neutrophils 4210, and monocytes 600.  Hemoglobin 13.7 and platelets at 255,000.      Laboratory studies on 5/2/2019 reported normal WBC 7010, including ANC 4020, lymphocytes 2180, monocytes 580, eosinophil 160.  He has normal platelets 205,000 and a normal hemoglobin 16.7 and MCV 87.2.     Laboratory studies 6/22/2020, show completely normal CBC including WBC 6920, neutrophils 3840, lymphocytes 2190 and monocytes 610.  He has normal hemoglobin 15.0, and platelets 267,000.      Patient return for reevaluation on 10/30/2020 because of recurrent lower extremity DVT.  I saw this patient in June 2020, when he did not tell me he had episodes of DVT a year earlier.  Patient recently called our office, reporting he was diagnosed of leg DVT again in August 2020 and has been taking Xarelto.  He requests to be evaluated for that matter.    Patient reports that more than a year ago, he had right leg DVT after twisting his leg from his job.  He reports anticoagulation probably for 6 months.  However he was not very clear for the, and he was not even clear for the timing of his DVT.  Upon searching medical records, I found  that he was diagnosed of acute right leg DVT 2 years ago in October 2018.  He had a venous Doppler study on 10/12/2018 at the [facility], which reported acute, nonocclusive deep vein thrombosis in the common femoral vein, and acute, occlusive superficial vein thrombosis in the great/saphenous vein from the saphenofemoral junction through the mid thigh.  There was also a left lower extremity acute occlusive superficial vein thrombosis in the great saphenous vein from the proximal thigh through the knee.  No evidence of DVT in the left leg.   There was no laboratory work-up for hypercoagulable status at that time.    Patient reports in August 2020, he was driving long distance to [place], sending his child to college.  After he returned to [place], he noticed swelling and tenderness in the left leg.  He had a venous Doppler study 8/22/2020 at the [facility], which reported acute left leg DVT in the gastrocnemius vein.  All other left-sided veins appear normal.  No evidence of DVT in the right common femoral vein.    We obtain comprehensive laboratory studies for hypercoagulable work-up on 10/30/2020.  He reported positive lupus anticoagulant, and mildly positive anticardiolipin IgM 30, and antiphosphatidylserine IgM 26, however negative for the IgG and IgA subtypes.  He was complete negative for the beta-2 glycoprotein 1 antibodies.  He was also positive for the antiphosphatidyl inositol IgM 25.8 units/mL but negative for IgG and IgA subtypes as well as the other members of the antiphospholipid antibody panel #2.  He had normal Antithrombin activity 111%, elevated protein S activity 150% and normal protein S free antigen 101%, elevated protein C activity 182% and normal protein C antigen 96%.    Laboratory study on 10/7/2022 reported normal WBC 9920, including ANC 6510, lymphocytes 2360 and monocytes 830.        MEDICATIONS: The current medication list was reviewed with the patient  "and updates in the EMR this date per the medical assistant. Medication dosages and frequencies were confirmed to be accurate        ALLERGIES: No known drug allergies.        SOCIAL HISTORY: . 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 Previously worked as . The patient has no history of tobacco smoking or alcohol use. No illegal drug use, no risk for HIV.        FAMILY HISTORY: Father  of heart attack at age 67. Patient's mother is in good health at age 75 in . A brother at age 52 with unknown health condition. His two sisters are both in good health in their 40' s. The patient has 3 teenage children all in good health condition . The patient's maternal grandmother had hypertension, and colon cancer. Maternal grandfather had bone cancer, no details. Paternal grandfather had prostate cancer.              VITAL SIGNS:    Vitals:    10/06/23 1632   BP: 121/90   Pulse: 74   Temp: 98.6 øF (37 øC)   TempSrc: Temporal   SpO2: 96%   Weight: 89.1 kg (196 lb 8 oz)   Height: 188 cm (74.02\")   PainSc:   7   PainLoc: Generalized  Comment: down spine, neck and right shoulder   ECO          PHYSICAL EXAMINATION: 10/6/2023  GENERAL:  Well-developed, well-nourished male in no acute distress.  Orientated to time place and the people.  SKIN:  Warm, dry without rashes, purpura or petechiae.  HEENT:  Normocephalic.   LYMPHATICS:  No cervical, supraclavicular adenopathy.  CHEST: Normal respiratory effort.  Lungs clear to auscultation. Good airflow.  CARDIAC:  Regular rate and rhythm without murmurs. Normal S1,S2.  ABDOMEN:  Soft, nontender with no organomegaly or masses.  Bowel sounds normal.  EXTREMITIES:  No lower extremity edema.      LABORATORY DATA:    Lab Results   Component Value Date    WBC 7.50 10/06/2023    HGB 14.1 10/06/2023    HCT 42.0 10/06/2023    MCV 88.6 10/06/2023     10/06/2023     Lab Results   Component Value Date    NEUTROABS 4.44 10/06/2023 "   Lymphocytes 2220 and monocytes 640 on 10/6/2023.    Lymphocytes 2360 and monocytes 839 on 10/7/2022.      Glucose   Date Value Ref Range Status   10/06/2023 91 65 - 99 mg/dL Final     BUN   Date Value Ref Range Status   10/06/2023 19 8 - 23 mg/dL Final   04/20/2022 14 8 - 26 mg/dL Final     Creatinine   Date Value Ref Range Status   10/06/2023 0.98 0.70 - 1.30 mg/dL Final   04/20/2022 1.03 0.73 - 1.18 mg/dL Final     Sodium   Date Value Ref Range Status   10/06/2023 140 136 - 145 mmol/L Final   04/20/2022 140 136 - 145 mmol/L Final     Potassium   Date Value Ref Range Status   10/06/2023 4.1 3.5 - 5.2 mmol/L Final   04/20/2022 4.5 3.5 - 5.1 mmol/L Final     Chloride   Date Value Ref Range Status   10/06/2023 104 98 - 107 mmol/L Final   04/20/2022 103 98 - 107 mmol/L Final     CO2   Date Value Ref Range Status   10/06/2023 22.2 22.0 - 29.0 mmol/L Final     Total CO2   Date Value Ref Range Status   04/20/2022 22 22 - 29 mmol/L Final     Calcium   Date Value Ref Range Status   10/06/2023 9.0 8.6 - 10.5 mg/dL Final   04/20/2022 10.2 8.4 - 10.2 mg/dL Final     Total Protein   Date Value Ref Range Status   10/06/2023 6.8 6.0 - 8.5 g/dL Final   04/20/2022 8.4 (H) 6.2 - 8.0 g/dL Final     Albumin   Date Value Ref Range Status   10/06/2023 4.3 3.5 - 5.2 g/dL Final   04/20/2022 5.4 (H) 3.2 - 4.6 g/dL Final     ALT (SGPT)   Date Value Ref Range Status   10/06/2023 14 1 - 41 U/L Final   04/20/2022 38 10 - 50 U/L Final     AST (SGOT)   Date Value Ref Range Status   10/06/2023 18 1 - 40 U/L Final   04/20/2022 34 10 - 50 U/L Final     Alkaline Phosphatase   Date Value Ref Range Status   10/06/2023 90 39 - 117 U/L Final   04/20/2022 83 40 - 150 U/L Final     Total Bilirubin   Date Value Ref Range Status   10/06/2023 0.5 0.0 - 1.2 mg/dL Final   04/20/2022 0.5 0.2 - 1.2 mg/dL Final     eGFR Non  Amer   Date Value Ref Range Status   08/22/2020 72 >60 mL/min/1.73 Final     A/G Ratio   Date Value Ref Range Status   04/20/2022  1.8 1.1 - 2.5 RATIO Final     BUN/Creatinine Ratio   Date Value Ref Range Status   10/06/2023 19.4 7.0 - 25.0 Final   04/20/2022 13.4 10.0 - 20.0 RATIO Final     Anion Gap   Date Value Ref Range Status   10/06/2023 13.8 5.0 - 15.0 mmol/L Final            IMAGING STUDY:  1.  Venous Doppler study on 8/22/2020 interpretation Summary    Acute left lower extremity deep vein thrombosis noted in the gastrocnemius.  All other left sided veins appeared normal.          2.  Venous Doppler study on 12/16/2020 interpretation Summary    Chronic left lower extremity deep vein thrombosis noted in the gastrocnemius.  All other veins appeared normal bilaterally.         ASSESSMENT:   1.  The patient is a 63 y.o.  male who comes in 10/30/2020 for recurrent lower extremity DVT which he did not tell me previously about his first episodes of DVT when he saw me in June 2020.    Nevertheless discussed with the patient, and searched medical records, and discovered that he had first episode of DVT in the right leg femoral vein, as well as superficial vein thrombosis in both legs in the great saphenous veins in October 2018.    Patient reports was anticoagulated for about 6-month.  According to patient he had injured his right leg from his job at the time.   Patient now has DVT in the left calf vein after long distance driving.  He currently is on Xarelto anticoagulation since 8/22/2020 after he was diagnosed.  I discussed with the patient on 10/30/2020, recommended comprehensive laboratory work-up for hypercoagulable status, including mutation for factor II and factor V Leiden, check protein C and protein S profile, Antithrombin III, and also check lupus anticoagulant, antiphospholipid antibodies for complete assessment.  Patient is agreeable.  I do recommend repeating venous Doppler study in 2 months, at the meantime he continue Xarelto anticoagulation for the time being.  Laboratory studies on 10/30/2020 reported positive lupus  anticoagulant, and also 3 mildly elevated IgM subtypes of antiphospholipid antibodies including anticardiolipin antibody, antiphosphatidylserine antibody and antiphosphatidyl inositol antibodies.  On 12/18/2020, I reviewed the laboratory results with patient, and pointed out that the mildly elevated antiphospholipid lipid antibodies could be falsely positive, caused by Xarelto anticoagulation.  These need to be repeated in the future daily of anticoagulation with NOAC.   Also his venous Doppler study on 12/16/2020 reported chronic left calf vein thrombosis.  I recommended to continue Xarelto anticoagulation for another 6 months, and then repeat venous Doppler study for reassessment.  Patient did not return for evaluation until 10/7/2022.  Patient continues to have persistent left leg edema, denies chest pain or dyspnea.  He is compliant with Xarelto anticoagulation 20 mg daily.  Patient reports he had a venous Doppler study at his primary care physician Dr. Alfaro's office not long ago.  We will try to get that the report.  For now he will continue Xarelto anticoagulation.  Patient presented for reevaluation on 10/06/2023. Patient reports that he continues to have clots in the superficial veins in both legs. Physical examination confirmed calf vein superficial vein thrombophlebitis. Patient has been tolerating Xarelto 20 mg daily and will be continued.    2.  Asymptomatic T-cell large granular lymphocytic leukemia, diagnosed in 2009.    The patient has no specific complaints, he denies recurrent bacterial infection.  Physical examination is benign, no lymphadenopathy..  Laboratory study on 6/22/2020 showed completely normal CBC including neutrophils, lymphocytes, hemoglobin and platelet.  No evidence of disease progress.   Lab study on 10/30/2020 reported completely normal CBC.  Continue to monitor.  Laboratory study on 12/18/2020 reported normal CBC.  On 10/7/2022 laboratory studies reported normal CBC including  normal ANC and lymphocytes.  No evidence for disease progression.  Patient presented for reevaluation on 10/06/2023. He denies fever, diaphoresis, chills, or recurrent infection. Laboratory study today showed a normal neutrophils at 4440 out of total WBC 7,500 and has lymphocytes 2220. Continue to monitor.      PLAN:  Continue Xarelto 20 mg daily, he gets prescription from Dr. Alfaro's office.  Patient will come back for reevaluation in 12 months for laboratory studies, CBC.  Patient reports he may need to have surgery. If that is the case, he needs to have Lovenox for bridging anticoagulation, I recommend holding Xarelto for 48 hours before the surgery and giving Lovenox injection 24 hours before the surgery. I think we can use low-dose Lovenox at 40 mg. Post the surgery, he can be restarted back on prophylactic Lovenox again at 40 mg daily for 1 week. If no evidence of bleeding, he can be put back on Xarelto 20 mg daily.     I discussed with the patient about laboratory results and further management plan.  Patient voiced understanding and agreeable.      ADRIANA HANLEY M.D., Ph.D.          cc:  MD KINDRA SIMONS M.D. - FAX: [phone] (patient's brother)        Transcribed from ambient dictation for Adriana Hanley MD PhD by Aida Talbot.  10/09/23   08:39 EDT    Patient or patient representative verbalized consent to the visit recording.    I have personally performed the services described in this document as transcribed by the above individual, and it is both accurate and complete.    Adriana Hanley MD PhD

## 2023-10-14 PROBLEM — Z79.01 ANTICOAGULATION ADEQUATE: Status: ACTIVE | Noted: 2023-10-14

## 2024-02-01 ENCOUNTER — TRANSCRIBE ORDERS (OUTPATIENT)
Dept: LAB | Facility: HOSPITAL | Age: 64
End: 2024-02-01
Payer: COMMERCIAL

## 2024-02-01 ENCOUNTER — LAB (OUTPATIENT)
Dept: LAB | Facility: HOSPITAL | Age: 64
End: 2024-02-01
Payer: COMMERCIAL

## 2024-02-01 DIAGNOSIS — M51.36 DEGENERATION OF LUMBAR INTERVERTEBRAL DISC: ICD-10-CM

## 2024-02-01 DIAGNOSIS — I82.562 CHRONIC DEEP VEIN THROMBOSIS (DVT) OF CALF MUSCLE VEIN OF LEFT LOWER EXTREMITY: ICD-10-CM

## 2024-02-01 DIAGNOSIS — M51.36 DEGENERATION OF LUMBAR INTERVERTEBRAL DISC: Primary | ICD-10-CM

## 2024-02-01 LAB
BASOPHILS # BLD AUTO: 0.05 10*3/MM3 (ref 0–0.2)
BASOPHILS NFR BLD AUTO: 0.7 % (ref 0–1.5)
DEPRECATED RDW RBC AUTO: 41.5 FL (ref 37–54)
EOSINOPHIL # BLD AUTO: 0.15 10*3/MM3 (ref 0–0.4)
EOSINOPHIL NFR BLD AUTO: 2.2 % (ref 0.3–6.2)
ERYTHROCYTE [DISTWIDTH] IN BLOOD BY AUTOMATED COUNT: 12.8 % (ref 12.3–15.4)
HCT VFR BLD AUTO: 40.2 % (ref 37.5–51)
HGB BLD-MCNC: 13.5 G/DL (ref 13–17.7)
IMM GRANULOCYTES # BLD AUTO: 0.02 10*3/MM3 (ref 0–0.05)
IMM GRANULOCYTES NFR BLD AUTO: 0.3 % (ref 0–0.5)
LYMPHOCYTES # BLD AUTO: 2.38 10*3/MM3 (ref 0.7–3.1)
LYMPHOCYTES NFR BLD AUTO: 35.6 % (ref 19.6–45.3)
MCH RBC QN AUTO: 29.8 PG (ref 26.6–33)
MCHC RBC AUTO-ENTMCNC: 33.6 G/DL (ref 31.5–35.7)
MCV RBC AUTO: 88.7 FL (ref 79–97)
MONOCYTES # BLD AUTO: 0.65 10*3/MM3 (ref 0.1–0.9)
MONOCYTES NFR BLD AUTO: 9.7 % (ref 5–12)
NEUTROPHILS NFR BLD AUTO: 3.43 10*3/MM3 (ref 1.7–7)
NEUTROPHILS NFR BLD AUTO: 51.5 % (ref 42.7–76)
NRBC BLD AUTO-RTO: 0 /100 WBC (ref 0–0.2)
PLATELET # BLD AUTO: 223 10*3/MM3 (ref 140–450)
PMV BLD AUTO: 10.7 FL (ref 6–12)
RBC # BLD AUTO: 4.53 10*6/MM3 (ref 4.14–5.8)
WBC NRBC COR # BLD AUTO: 6.68 10*3/MM3 (ref 3.4–10.8)

## 2024-02-01 PROCEDURE — 85025 COMPLETE CBC W/AUTO DIFF WBC: CPT

## 2024-02-01 PROCEDURE — 36415 COLL VENOUS BLD VENIPUNCTURE: CPT

## 2024-03-01 ENCOUNTER — TRANSCRIBE ORDERS (OUTPATIENT)
Dept: ADMINISTRATIVE | Facility: HOSPITAL | Age: 64
End: 2024-03-01
Payer: COMMERCIAL

## 2024-03-01 ENCOUNTER — LAB (OUTPATIENT)
Dept: LAB | Facility: HOSPITAL | Age: 64
End: 2024-03-01
Payer: COMMERCIAL

## 2024-03-01 DIAGNOSIS — Z01.818 PRE-OP EXAM: Primary | ICD-10-CM

## 2024-03-01 DIAGNOSIS — Z01.818 PRE-OP EXAM: ICD-10-CM

## 2024-03-01 LAB
INR PPP: 1.59 (ref 0.9–1.1)
PROTHROMBIN TIME: 19.3 SECONDS (ref 11.7–14.2)

## 2024-03-01 PROCEDURE — 36415 COLL VENOUS BLD VENIPUNCTURE: CPT

## 2024-03-01 PROCEDURE — 85610 PROTHROMBIN TIME: CPT

## 2024-03-06 ENCOUNTER — TRANSCRIBE ORDERS (OUTPATIENT)
Dept: LAB | Facility: HOSPITAL | Age: 64
End: 2024-03-06
Payer: COMMERCIAL

## 2024-03-06 ENCOUNTER — LAB (OUTPATIENT)
Dept: LAB | Facility: HOSPITAL | Age: 64
End: 2024-03-06
Payer: COMMERCIAL

## 2024-03-06 DIAGNOSIS — Z01.89 PROTIME MONITORING: ICD-10-CM

## 2024-03-06 DIAGNOSIS — Z01.89 PROTIME MONITORING: Primary | ICD-10-CM

## 2024-03-06 LAB
BASOPHILS # BLD AUTO: 0.04 10*3/MM3 (ref 0–0.2)
BASOPHILS NFR BLD AUTO: 0.7 % (ref 0–1.5)
DEPRECATED RDW RBC AUTO: 41 FL (ref 37–54)
EOSINOPHIL # BLD AUTO: 0.17 10*3/MM3 (ref 0–0.4)
EOSINOPHIL NFR BLD AUTO: 2.9 % (ref 0.3–6.2)
ERYTHROCYTE [DISTWIDTH] IN BLOOD BY AUTOMATED COUNT: 12.5 % (ref 12.3–15.4)
HCT VFR BLD AUTO: 43.5 % (ref 37.5–51)
HGB BLD-MCNC: 14.7 G/DL (ref 13–17.7)
IMM GRANULOCYTES # BLD AUTO: 0.02 10*3/MM3 (ref 0–0.05)
IMM GRANULOCYTES NFR BLD AUTO: 0.3 % (ref 0–0.5)
INR PPP: 1 (ref 0.9–1.1)
LYMPHOCYTES # BLD AUTO: 2.17 10*3/MM3 (ref 0.7–3.1)
LYMPHOCYTES NFR BLD AUTO: 37.5 % (ref 19.6–45.3)
MCH RBC QN AUTO: 30 PG (ref 26.6–33)
MCHC RBC AUTO-ENTMCNC: 33.8 G/DL (ref 31.5–35.7)
MCV RBC AUTO: 88.8 FL (ref 79–97)
MONOCYTES # BLD AUTO: 0.61 10*3/MM3 (ref 0.1–0.9)
MONOCYTES NFR BLD AUTO: 10.6 % (ref 5–12)
NEUTROPHILS NFR BLD AUTO: 2.77 10*3/MM3 (ref 1.7–7)
NEUTROPHILS NFR BLD AUTO: 48 % (ref 42.7–76)
NRBC BLD AUTO-RTO: 0 /100 WBC (ref 0–0.2)
PLATELET # BLD AUTO: 218 10*3/MM3 (ref 140–450)
PMV BLD AUTO: 10.2 FL (ref 6–12)
PROTHROMBIN TIME: 13.3 SECONDS (ref 11.7–14.2)
RBC # BLD AUTO: 4.9 10*6/MM3 (ref 4.14–5.8)
WBC NRBC COR # BLD AUTO: 5.78 10*3/MM3 (ref 3.4–10.8)

## 2024-03-06 PROCEDURE — 36415 COLL VENOUS BLD VENIPUNCTURE: CPT

## 2024-03-06 PROCEDURE — 85025 COMPLETE CBC W/AUTO DIFF WBC: CPT

## 2024-03-06 PROCEDURE — 85610 PROTHROMBIN TIME: CPT

## 2024-11-26 DIAGNOSIS — I82.562 CHRONIC DEEP VEIN THROMBOSIS (DVT) OF CALF MUSCLE VEIN OF LEFT LOWER EXTREMITY: Primary | ICD-10-CM

## 2024-11-26 DIAGNOSIS — C95.10 CHRONIC LEUKEMIA OF UNSPECIFIED CELL TYPE NOT HAVING ACHIEVED REMISSION: ICD-10-CM

## 2024-11-28 NOTE — PROGRESS NOTES
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    Recurrent lower extremity DVT.   Right femoral vein acute DVT in August 2018, was on Xarelto for about 6 months.  Left calf vein acute DVT discovered on 8/22/2020.  On Xarelto anticoagulation.  Lab study in October 2021 reported positive for lupus anticoagulant, and mildly elevated antiphospholipid antibodies.        HISTORY OF PRESENT ILLNESS: The patient is a 64 y.o.  male, who presented today for annual follow up.  He continues on Xarelto 20 mg daily, tolerating without signs or symptoms of bleeding.  3 weeks ago he started to experience some redness behind his right knee, this resolved after 3 days.  Symptoms not recurred.  He does report having cramping in both of his legs during the night several nights a week which has been an ongoing issue.  He is drinking 1-2 sports drinks daily but is otherwise drinking large amounts of mountain dew.  He denies any lower extremity edema.  He denies shortness of breath or chest pain.      PAST MEDICAL HISTORY:    Two strep throat infections in June 2009.    Gastroesophageal reflux.    Seasonal allergies.        PAST SURGICAL HISTORY:    Multiple lipoma resection from his back.    Right ankle Achilles tendon repair.        HEMATOLOGIC/ONCOLOGIC HISTORY: History from previous dates can be found in a separate document, see our note from 2/28/2011.        Mr. Olson was a 49-year-old  male who was found to have mildly elevated lymphocytes of 5000 on 7/23/09 when he had total WBC of 8300, neutrophils were 2400, platelets  were 224,000 and hemoglobin was 14.9.  Chemistry lab that day was unremarkable with normal renal function, liver function.  Hepatitis A, B and C testing were all negativ  e   that day.  The patient had repeat labs on 7/29/09 with total WBC of 8700, neutrophil of 3700 and lymphocytes of 3300.  The patient was positive for strep screen at  that time.  Peripheral blood flow cytometry study showed abnormal T cell phenotype which w  as positive for CD2, CD3, CD5 dim, CD7 dim, CD8, CD38, CD57 and HLA-DR dim but negative for CD4, CD16 and CD56. The abnormal cells account for about 23% of the whole leukocyte population.  TCR gene rearrangement by PCR test was positive as reported by [facility].  It was negative for bcr/abl rearrangement.      The patient was referred to our office for initial consultation on 8/24/09. The patient is in his baseline health condition.  He does not feel sick.  The patient looks normal by visual examination.    Physical examination does not show any enlarged lymph nodes, hepatomegaly or splenomegaly.  CBC today in the office showed total WBC of 6590 with neutrophils 1880 and lymphocytes of 3800.  Platelets are normal at 174,000 with normal hemoglobin 14.1.  Per  ipheral blood smear reviewed and there are no signs for malignancy, except slightly increased amount of large granulocytic lymphocytes.          This was discussed with the patient and bone marrow aspiration and biopsy was obtained after consent was signed.  We w  ill also schedule patient for CT scan of neck, chest, abdomen and pelvis with IV contrast to rule out any possible lymphadenopathy or organomegaly.          Bone marrow aspiration and biopsy performed 8/24/09 showing normal cellular bone marrow, with abnormal T   cell population 20%, positive for CD3, CD8, CD57, dim for CD5, CD2 and CD7 fitting with T cell large granular lymphocytic leukemia.  Cytogenetic studies showed normal L karyotype.        Patient had CT scan of neck, chest, abdomen or pelvis on 8/31/09 which virtually is unremarkable.  No lymphadenopathy, no splenomegaly, no hepatomegaly.        The patient was re-evaluated on 05/08/2015. The patient reports no symptoms. Physical examination was unremarkable. Laboratory study reporting normal total WBC at 7050, including lymphocytes at 3270,  neutrophils 2920, and monocytes 700. Hemoglobin was normal at 14.5 and platelets at 247,000.        Laboratory studies on 11/1/2017 reports a normal total WBC at 7280, including lymphocytes at 2280, and improved neutrophils 4210, and monocytes 600.  Hemoglobin 13.7 and platelets at 255,000.      Laboratory studies on 5/2/2019 reported normal WBC 7010, including ANC 4020, lymphocytes 2180, monocytes 580, eosinophil 160.  He has normal platelets 205,000 and a normal hemoglobin 16.7 and MCV 87.2.     Laboratory studies 6/22/2020, show completely normal CBC including WBC 6920, neutrophils 3840, lymphocytes 2190 and monocytes 610.  He has normal hemoglobin 15.0, and platelets 267,000.      Patient return for reevaluation on 10/30/2020 because of recurrent lower extremity DVT.  I saw this patient in June 2020, when he did not tell me he had episodes of DVT a year earlier.  Patient recently called our office, reporting he was diagnosed of leg DVT again in August 2020 and has been taking Xarelto.  He requests to be evaluated for that matter.    Patient reports that more than a year ago, he had right leg DVT after twisting his leg from his job.  He reports anticoagulation probably for 6 months.  However he was not very clear for the, and he was not even clear for the timing of his DVT.  Upon searching medical records, I found that he was diagnosed of acute right leg DVT 2 years ago in October 2018.  He had a venous Doppler study on 10/12/2018 at the [facility], which reported acute, nonocclusive deep vein thrombosis in the common femoral vein, and acute, occlusive superficial vein thrombosis in the great/saphenous vein from the saphenofemoral junction through the mid thigh.  There was also a left lower extremity acute occlusive superficial vein thrombosis in the great saphenous vein from the proximal thigh through the knee.  No evidence of DVT in the left leg.   There was no laboratory work-up for  hypercoagulable status at that time.    Patient reports in 2020, he was driving long distance to [place], sending his child to college.  After he returned to [place], he noticed swelling and tenderness in the left leg.  He had a venous Doppler study 2020 at the [facility], which reported acute left leg DVT in the gastrocnemius vein.  All other left-sided veins appear normal.  No evidence of DVT in the right common femoral vein.    We obtain comprehensive laboratory studies for hypercoagulable work-up on 10/30/2020.  He reported positive lupus anticoagulant, and mildly positive anticardiolipin IgM 30, and antiphosphatidylserine IgM 26, however negative for the IgG and IgA subtypes.  He was complete negative for the beta-2 glycoprotein 1 antibodies.  He was also positive for the antiphosphatidyl inositol IgM 25.8 units/mL but negative for IgG and IgA subtypes as well as the other members of the antiphospholipid antibody panel #2.  He had normal Antithrombin activity 111%, elevated protein S activity 150% and normal protein S free antigen 101%, elevated protein C activity 182% and normal protein C antigen 96%.    Laboratory study on 10/7/2022 reported normal WBC 9920, including ANC 6510, lymphocytes 2360 and monocytes 830.        MEDICATIONS: The current medication list was reviewed with the patient and updates in the EMR this date per the medical assistant. Medication dosages and frequencies were confirmed to be accurate        ALLERGIES: No known drug allergies.        SOCIAL HISTORY: . 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 Previously worked as . The patient has no history of tobacco smoking or alcohol use. No illegal drug use, no risk for HIV.        FAMILY HISTORY: Father  of heart attack at age 67. Patient's mother is in good health at age 75 in . A brother at age 52 with unknown health condition. His two sisters are  both in good health in their 40' s. The patient has 3 teenage children all in good health condition . The patient's maternal grandmother had hypertension, and colon cancer. Maternal grandfather had bone cancer, no details. Paternal grandfather had prostate cancer.          ROS per HPI.        VITAL SIGNS:    There were no vitals filed for this visit.  ECO          PHYSICAL EXAMINATION:   GENERAL:  Well-developed, well-nourished male in no acute distress.  Orientated to time place and the people.  SKIN:  Warm, dry without rashes, purpura or petechiae.  HEENT:  Normocephalic.   LYMPHATICS:  No cervical, supraclavicular adenopathy.  CHEST: Normal respiratory effort.  Lungs clear to auscultation. Good airflow.  CARDIAC:  Regular rate and rhythm without murmurs. Normal S1,S2.  ABDOMEN:  Soft, nontender with no organomegaly or masses.  Bowel sounds normal.  EXTREMITIES:  No lower extremity edema.      LABORATORY DATA:    Lab Results   Component Value Date    WBC 5.78 2024    HGB 14.7 2024    HCT 43.5 2024    MCV 88.8 2024     2024     Lab Results   Component Value Date    NEUTROABS 2.77 2024   Lymphocytes 2220 and monocytes 640 on 10/6/2023.    Lymphocytes 2360 and monocytes 839 on 10/7/2022.      Glucose   Date Value Ref Range Status   10/06/2023 91 65 - 99 mg/dL Final     BUN   Date Value Ref Range Status   10/06/2023 19 8 - 23 mg/dL Final   03/15/2023 15 8 - 26 mg/dL Final     Creatinine   Date Value Ref Range Status   10/06/2023 0.98 0.70 - 1.30 mg/dL Final   03/15/2023 0.79 0.73 - 1.18 mg/dL Final     Sodium   Date Value Ref Range Status   10/06/2023 140 136 - 145 mmol/L Final   03/15/2023 139 136 - 145 mmol/L Final     Comment:     (note)  Excess protein and/or lipids can falsely decrease sodium levels  (pseudo hyponatremia).     Potassium   Date Value Ref Range Status   10/06/2023 4.1 3.5 - 5.2 mmol/L Final   03/15/2023 4.4 3.5 - 5.1 mmol/L Final     Comment:      Falsely elevated potassium can occur in patients with high WBC or platelet counts.     Chloride   Date Value Ref Range Status   10/06/2023 104 98 - 107 mmol/L Final   03/15/2023 107 98 - 107 mmol/L Final     Comment:     (note)  Falsely elevated chloride levels can be seen in patients taking  medications which contain bromide.     CO2   Date Value Ref Range Status   10/06/2023 22.2 22.0 - 29.0 mmol/L Final     Total CO2   Date Value Ref Range Status   03/15/2023 23 22 - 29 mmol/L Final   04/20/2022 22 22 - 29 mmol/L Final     Calcium   Date Value Ref Range Status   10/06/2023 9.0 8.6 - 10.5 mg/dL Final   03/15/2023 10.1 8.4 - 10.2 mg/dL Final     Total Protein   Date Value Ref Range Status   10/06/2023 6.8 6.0 - 8.5 g/dL Final   03/15/2023 8.3 (H) 6.2 - 8.0 g/dL Final     Albumin   Date Value Ref Range Status   10/06/2023 4.3 3.5 - 5.2 g/dL Final   03/15/2023 4.8 (H) 3.2 - 4.6 g/dL Final     ALT (SGPT)   Date Value Ref Range Status   10/06/2023 14 1 - 41 U/L Final   03/15/2023 36 0 - 55 U/L Final     AST (SGOT)   Date Value Ref Range Status   10/06/2023 18 1 - 40 U/L Final   03/15/2023 26 5 - 34 U/L Final     Alkaline Phosphatase   Date Value Ref Range Status   10/06/2023 90 39 - 117 U/L Final   03/15/2023 106 40 - 150 U/L Final     Total Bilirubin   Date Value Ref Range Status   10/06/2023 0.5 0.0 - 1.2 mg/dL Final   03/15/2023 0.8 0.2 - 1.2 mg/dL Final     eGFR Non  Amer   Date Value Ref Range Status   08/22/2020 72 >60 mL/min/1.73 Final     A/G Ratio   Date Value Ref Range Status   03/15/2023 1.4 1.1 - 2.5 RATIO Final     BUN/Creatinine Ratio   Date Value Ref Range Status   10/06/2023 19.4 7.0 - 25.0 Final   03/15/2023 19.0 RATIO Final     Anion Gap   Date Value Ref Range Status   10/06/2023 13.8 5.0 - 15.0 mmol/L Final   03/15/2023 9 5 - 13 (arb'U) Final     Comment:     (note)  Calculation- Na - (Cl + CO2)            IMAGING STUDY:  1.  Venous Doppler study on 8/22/2020 interpretation Summary    Acute  left lower extremity deep vein thrombosis noted in the gastrocnemius.  All other left sided veins appeared normal.          2.  Venous Doppler study on 12/16/2020 interpretation Summary    Chronic left lower extremity deep vein thrombosis noted in the gastrocnemius.  All other veins appeared normal bilaterally.         ASSESSMENT:   1.  The patient is a 64 y.o.  male who comes in 10/30/2020 for recurrent lower extremity DVT which he did not tell me previously about his first episodes of DVT when he saw me in June 2020.    Nevertheless discussed with the patient, and searched medical records, and discovered that he had first episode of DVT in the right leg femoral vein, as well as superficial vein thrombosis in both legs in the great saphenous veins in October 2018.    Patient reports was anticoagulated for about 6-month.  According to patient he had injured his right leg from his job at the time.   Patient now has DVT in the left calf vein after long distance driving.  He currently is on Xarelto anticoagulation since 8/22/2020 after he was diagnosed.  I discussed with the patient on 10/30/2020, recommended comprehensive laboratory work-up for hypercoagulable status, including mutation for factor II and factor V Leiden, check protein C and protein S profile, Antithrombin III, and also check lupus anticoagulant, antiphospholipid antibodies for complete assessment.  Patient is agreeable.  I do recommend repeating venous Doppler study in 2 months, at the meantime he continue Xarelto anticoagulation for the time being.  Laboratory studies on 10/30/2020 reported positive lupus anticoagulant, and also 3 mildly elevated IgM subtypes of antiphospholipid antibodies including anticardiolipin antibody, antiphosphatidylserine antibody and antiphosphatidyl inositol antibodies.  On 12/18/2020, I reviewed the laboratory results with patient, and pointed out that the mildly elevated antiphospholipid lipid antibodies could  be falsely positive, caused by Xarelto anticoagulation.  These need to be repeated in the future daily of anticoagulation with NOAC.   Also his venous Doppler study on 12/16/2020 reported chronic left calf vein thrombosis.  I recommended to continue Xarelto anticoagulation for another 6 months, and then repeat venous Doppler study for reassessment.  Patient did not return for evaluation until 10/7/2022.  Patient continues to have persistent left leg edema, denies chest pain or dyspnea.  He is compliant with Xarelto anticoagulation 20 mg daily.  Patient reports he had a venous Doppler study at his primary care physician Dr. Alfaro's office not long ago.  We will try to get that the report.  For now he will continue Xarelto anticoagulation.  Patient presented for reevaluation on 10/06/2023. Patient reports that he continues to have clots in the superficial veins in both legs. Physical examination confirmed calf vein superficial vein thrombophlebitis. Patient has been tolerating Xarelto 20 mg daily and will be continued.  12/2/2024: Continues Xarelto 20 mg daily.  Tolerating without signs or symptoms of bleeding.  Reports about 3 weeks ago he experienced swelling and erythema to the back of his right knee, and has been experiencing cramping in his legs intermittently since then as well.  Discussed obtaining Doppler, patient declines at this time and would just like to monitor symptoms.  He feels this might be related to dehydration, as he drinks a large amount of Mountain Dew.  He is going to try to cut down on Mountain Dew and increase his water intake to see if this helps.  Did let him know if symptoms persist, he should contact us so we can order Doppler.    2.  Asymptomatic T-cell large granular lymphocytic leukemia, diagnosed in 2009.    The patient has no specific complaints, he denies recurrent bacterial infection.  Physical examination is benign, no lymphadenopathy..  Laboratory study on 6/22/2020 showed  completely normal CBC including neutrophils, lymphocytes, hemoglobin and platelet.  No evidence of disease progress.   Lab study on 10/30/2020 reported completely normal CBC.  Continue to monitor.  Laboratory study on 12/18/2020 reported normal CBC.  On 10/7/2022 laboratory studies reported normal CBC including normal ANC and lymphocytes.  No evidence for disease progression.  Patient presented for reevaluation on 10/06/2023. He denies fever, diaphoresis, chills, or recurrent infection. Laboratory study today showed a normal neutrophils at 4440 out of total WBC 7,500 and has lymphocytes 2220. Continue to monitor.  12/2/2024: WBC 8.0, ANC 4.58, hemoglobin 14.3, platelets 219, absolute lymphocytes 2.48.      PLAN:   Recommended obtaining Doppler, patient declined.  If he changes his mind he will call and notify us and we will order Doppler.  Continue Xarelto 20 mg daily, he gets prescription from Dr. Alfaro's office.  Patient will come back for reevaluation in 12 months for CBC and MD.  If patient must undergo surgery, he needs to have Lovenox for bridging anticoagulation, recommend holding Xarelto for 48 hours before the surgery and giving Lovenox injection 24 hours before the surgery.  We can use low-dose Lovenox at 40 mg. Post the surgery, he can be restarted back on prophylactic Lovenox again at 40 mg daily for 1 week. If no evidence of bleeding, he can be put back on Xarelto 20 mg daily.     High risk med    cc:  MD KINDRA SIMONS M.D. - FAX: [phone] (patient's brother)

## 2024-12-02 ENCOUNTER — OFFICE VISIT (OUTPATIENT)
Dept: ONCOLOGY | Facility: CLINIC | Age: 64
End: 2024-12-02
Payer: COMMERCIAL

## 2024-12-02 ENCOUNTER — LAB (OUTPATIENT)
Dept: LAB | Facility: HOSPITAL | Age: 64
End: 2024-12-02
Payer: COMMERCIAL

## 2024-12-02 VITALS
DIASTOLIC BLOOD PRESSURE: 63 MMHG | RESPIRATION RATE: 16 BRPM | HEIGHT: 74 IN | SYSTOLIC BLOOD PRESSURE: 123 MMHG | TEMPERATURE: 97.3 F | WEIGHT: 197.9 LBS | BODY MASS INDEX: 25.4 KG/M2 | HEART RATE: 88 BPM | OXYGEN SATURATION: 98 %

## 2024-12-02 DIAGNOSIS — I82.562 CHRONIC DEEP VEIN THROMBOSIS (DVT) OF CALF MUSCLE VEIN OF LEFT LOWER EXTREMITY: ICD-10-CM

## 2024-12-02 DIAGNOSIS — C95.10 CHRONIC LEUKEMIA OF UNSPECIFIED CELL TYPE NOT HAVING ACHIEVED REMISSION: ICD-10-CM

## 2024-12-02 DIAGNOSIS — I82.562 CHRONIC DEEP VEIN THROMBOSIS (DVT) OF CALF MUSCLE VEIN OF LEFT LOWER EXTREMITY: Primary | ICD-10-CM

## 2024-12-02 LAB
BASOPHILS # BLD AUTO: 0.06 10*3/MM3 (ref 0–0.2)
BASOPHILS NFR BLD AUTO: 0.8 % (ref 0–1.5)
DEPRECATED RDW RBC AUTO: 39.5 FL (ref 37–54)
EOSINOPHIL # BLD AUTO: 0.21 10*3/MM3 (ref 0–0.4)
EOSINOPHIL NFR BLD AUTO: 2.6 % (ref 0.3–6.2)
ERYTHROCYTE [DISTWIDTH] IN BLOOD BY AUTOMATED COUNT: 12.4 % (ref 12.3–15.4)
HCT VFR BLD AUTO: 41.1 % (ref 37.5–51)
HGB BLD-MCNC: 14.3 G/DL (ref 13–17.7)
IMM GRANULOCYTES # BLD AUTO: 0.07 10*3/MM3 (ref 0–0.05)
IMM GRANULOCYTES NFR BLD AUTO: 0.9 % (ref 0–0.5)
LYMPHOCYTES # BLD AUTO: 2.48 10*3/MM3 (ref 0.7–3.1)
LYMPHOCYTES NFR BLD AUTO: 31 % (ref 19.6–45.3)
MCH RBC QN AUTO: 30.6 PG (ref 26.6–33)
MCHC RBC AUTO-ENTMCNC: 34.8 G/DL (ref 31.5–35.7)
MCV RBC AUTO: 88 FL (ref 79–97)
MONOCYTES # BLD AUTO: 0.6 10*3/MM3 (ref 0.1–0.9)
MONOCYTES NFR BLD AUTO: 7.5 % (ref 5–12)
NEUTROPHILS NFR BLD AUTO: 4.58 10*3/MM3 (ref 1.7–7)
NEUTROPHILS NFR BLD AUTO: 57.2 % (ref 42.7–76)
NRBC BLD AUTO-RTO: 0 /100 WBC (ref 0–0.2)
PLATELET # BLD AUTO: 219 10*3/MM3 (ref 140–450)
PMV BLD AUTO: 10.3 FL (ref 6–12)
RBC # BLD AUTO: 4.67 10*6/MM3 (ref 4.14–5.8)
WBC NRBC COR # BLD AUTO: 8 10*3/MM3 (ref 3.4–10.8)

## 2024-12-02 PROCEDURE — 36415 COLL VENOUS BLD VENIPUNCTURE: CPT

## 2024-12-02 PROCEDURE — 99214 OFFICE O/P EST MOD 30 MIN: CPT | Performed by: NURSE PRACTITIONER

## 2024-12-02 PROCEDURE — 85025 COMPLETE CBC W/AUTO DIFF WBC: CPT
